# Patient Record
Sex: MALE | Race: WHITE | NOT HISPANIC OR LATINO | Employment: OTHER | URBAN - METROPOLITAN AREA
[De-identification: names, ages, dates, MRNs, and addresses within clinical notes are randomized per-mention and may not be internally consistent; named-entity substitution may affect disease eponyms.]

---

## 2019-01-28 ENCOUNTER — HOSPITAL ENCOUNTER (OUTPATIENT)
Dept: NUTRITION | Facility: HOSPITAL | Age: 64
End: 2019-01-29
Attending: INTERNAL MEDICINE | Admitting: INTERNAL MEDICINE

## 2019-01-28 LAB
ABS NEUT (OLG): 7.92 X10(3)/MCL (ref 2.1–9.2)
ALBUMIN SERPL-MCNC: 3.7 GM/DL (ref 3.4–5)
ALBUMIN/GLOB SERPL: 1 {RATIO}
ALP SERPL-CCNC: 50 UNIT/L (ref 50–136)
ALT SERPL-CCNC: 21 UNIT/L (ref 12–78)
APTT PPP: 29.5 SECOND(S) (ref 24.8–36.9)
AST SERPL-CCNC: 13 UNIT/L (ref 15–37)
BASOPHILS # BLD AUTO: 0 X10(3)/MCL (ref 0–0.2)
BASOPHILS NFR BLD AUTO: 0 %
BILIRUB SERPL-MCNC: 0.4 MG/DL (ref 0.2–1)
BILIRUBIN DIRECT+TOT PNL SERPL-MCNC: 0.1 MG/DL (ref 0–0.2)
BILIRUBIN DIRECT+TOT PNL SERPL-MCNC: 0.3 MG/DL (ref 0–0.8)
BUN SERPL-MCNC: 13 MG/DL (ref 7–18)
CALCIUM SERPL-MCNC: 9.3 MG/DL (ref 8.5–10.1)
CHLORIDE SERPL-SCNC: 101 MMOL/L (ref 98–107)
CO2 SERPL-SCNC: 28 MMOL/L (ref 21–32)
CREAT SERPL-MCNC: 1.1 MG/DL (ref 0.7–1.3)
EOSINOPHIL # BLD AUTO: 0 X10(3)/MCL (ref 0–0.9)
EOSINOPHIL NFR BLD AUTO: 0 %
ERYTHROCYTE [DISTWIDTH] IN BLOOD BY AUTOMATED COUNT: 14.4 % (ref 11.5–17)
GLOBULIN SER-MCNC: 3.6 GM/DL (ref 2.4–3.5)
GLUCOSE SERPL-MCNC: 96 MG/DL (ref 74–106)
HCT VFR BLD AUTO: 50.5 % (ref 42–52)
HGB BLD-MCNC: 17.3 GM/DL (ref 14–18)
INR PPP: 1 (ref 0–1.3)
LYMPHOCYTES # BLD AUTO: 1.3 X10(3)/MCL (ref 0.6–4.6)
LYMPHOCYTES NFR BLD AUTO: 13 %
MCH RBC QN AUTO: 31.7 PG (ref 27–31)
MCHC RBC AUTO-ENTMCNC: 34.3 GM/DL (ref 33–36)
MCV RBC AUTO: 92.7 FL (ref 80–94)
MONOCYTES # BLD AUTO: 0.7 X10(3)/MCL (ref 0.1–1.3)
MONOCYTES NFR BLD AUTO: 7 %
NEUTROPHILS # BLD AUTO: 7.92 X10(3)/MCL (ref 2.1–9.2)
NEUTROPHILS NFR BLD AUTO: 79 %
PLATELET # BLD AUTO: 239 X10(3)/MCL (ref 130–400)
PMV BLD AUTO: 10.5 FL (ref 9.4–12.4)
POTASSIUM SERPL-SCNC: 4.8 MMOL/L (ref 3.5–5.1)
PROT SERPL-MCNC: 7.3 GM/DL (ref 6.4–8.2)
PROTHROMBIN TIME: 13.6 SECOND(S) (ref 12.2–14.7)
RBC # BLD AUTO: 5.45 X10(6)/MCL (ref 4.7–6.1)
SODIUM SERPL-SCNC: 136 MMOL/L (ref 136–145)
TROPONIN I SERPL-MCNC: <0.02 NG/ML (ref 0.02–0.49)
WBC # SPEC AUTO: 10 X10(3)/MCL (ref 4.5–11.5)

## 2019-01-29 LAB
ABS NEUT (OLG): 8.74 X10(3)/MCL (ref 2.1–9.2)
ALBUMIN SERPL-MCNC: 3.2 GM/DL (ref 3.4–5)
ALBUMIN/GLOB SERPL: 1.2 RATIO (ref 1.1–2)
ALP SERPL-CCNC: 50 UNIT/L (ref 50–136)
ALT SERPL-CCNC: 17 UNIT/L (ref 12–78)
APPEARANCE, UA: CLEAR
APTT PPP: 27.6 SECOND(S) (ref 24.8–36.9)
AST SERPL-CCNC: 13 UNIT/L (ref 15–37)
BACTERIA SPEC CULT: NORMAL /HPF
BASOPHILS # BLD AUTO: 0.1 X10(3)/MCL (ref 0–0.2)
BASOPHILS NFR BLD AUTO: 0 %
BILIRUB SERPL-MCNC: 0.9 MG/DL (ref 0.2–1)
BILIRUB UR QL STRIP: NEGATIVE
BILIRUBIN DIRECT+TOT PNL SERPL-MCNC: 0.2 MG/DL (ref 0–0.5)
BILIRUBIN DIRECT+TOT PNL SERPL-MCNC: 0.7 MG/DL (ref 0–0.8)
BUN SERPL-MCNC: 10 MG/DL (ref 7–18)
CALCIUM SERPL-MCNC: 8.7 MG/DL (ref 8.5–10.1)
CHLORIDE SERPL-SCNC: 106 MMOL/L (ref 98–107)
CHOLEST SERPL-MCNC: 178 MG/DL (ref 0–200)
CHOLEST/HDLC SERPL: 3.7 {RATIO} (ref 0–5)
CO2 SERPL-SCNC: 22 MMOL/L (ref 21–32)
COLOR UR: YELLOW
CREAT SERPL-MCNC: 0.8 MG/DL (ref 0.7–1.3)
EOSINOPHIL # BLD AUTO: 0 X10(3)/MCL (ref 0–0.9)
EOSINOPHIL NFR BLD AUTO: 0 %
ERYTHROCYTE [DISTWIDTH] IN BLOOD BY AUTOMATED COUNT: 14.3 % (ref 11.5–17)
EST. AVERAGE GLUCOSE BLD GHB EST-MCNC: 114 MG/DL
GLOBULIN SER-MCNC: 2.6 GM/DL (ref 2.4–3.5)
GLUCOSE (UA): NEGATIVE
GLUCOSE SERPL-MCNC: 90 MG/DL (ref 74–106)
HBA1C MFR BLD: 5.6 % (ref 4.2–6.3)
HCT VFR BLD AUTO: 48 % (ref 42–52)
HDLC SERPL-MCNC: 48 MG/DL (ref 35–60)
HGB BLD-MCNC: 16.4 GM/DL (ref 14–18)
HGB UR QL STRIP: NEGATIVE
INR PPP: 1 (ref 0–1.3)
KETONES UR QL STRIP: NEGATIVE
LDLC SERPL CALC-MCNC: 112 MG/DL (ref 0–129)
LEUKOCYTE ESTERASE UR QL STRIP: NEGATIVE
LYMPHOCYTES # BLD AUTO: 1.5 X10(3)/MCL (ref 0.6–4.6)
LYMPHOCYTES NFR BLD AUTO: 13 %
MCH RBC QN AUTO: 31.7 PG (ref 27–31)
MCHC RBC AUTO-ENTMCNC: 34.2 GM/DL (ref 33–36)
MCV RBC AUTO: 92.7 FL (ref 80–94)
MONOCYTES # BLD AUTO: 0.8 X10(3)/MCL (ref 0.1–1.3)
MONOCYTES NFR BLD AUTO: 7 %
NEUTROPHILS # BLD AUTO: 8.74 X10(3)/MCL (ref 2.1–9.2)
NEUTROPHILS NFR BLD AUTO: 78 %
NITRITE UR QL STRIP: NEGATIVE
PH UR STRIP: 7 [PH] (ref 5–9)
PLATELET # BLD AUTO: 243 X10(3)/MCL (ref 130–400)
PMV BLD AUTO: 10.3 FL (ref 9.4–12.4)
POTASSIUM SERPL-SCNC: 4 MMOL/L (ref 3.5–5.1)
PROT SERPL-MCNC: 5.8 GM/DL (ref 6.4–8.2)
PROT UR QL STRIP: NEGATIVE
PROTHROMBIN TIME: 13.5 SECOND(S) (ref 12.2–14.7)
RBC # BLD AUTO: 5.18 X10(6)/MCL (ref 4.7–6.1)
RBC #/AREA URNS HPF: NORMAL /[HPF]
SODIUM SERPL-SCNC: 137 MMOL/L (ref 136–145)
SP GR UR STRIP: 1.01 (ref 1–1.03)
SQUAMOUS EPITHELIAL, UA: NORMAL
TRIGL SERPL-MCNC: 91 MG/DL (ref 30–150)
TSH SERPL-ACNC: 2.11 MIU/L (ref 0.36–3.74)
UROBILINOGEN UR STRIP-ACNC: 1
VLDLC SERPL CALC-MCNC: 18 MG/DL
WBC # SPEC AUTO: 11.2 X10(3)/MCL (ref 4.5–11.5)
WBC #/AREA URNS HPF: NORMAL /HPF

## 2021-02-18 ENCOUNTER — HISTORICAL (OUTPATIENT)
Dept: RADIOLOGY | Facility: HOSPITAL | Age: 66
End: 2021-02-18

## 2021-03-01 LAB — CRC RECOMMENDATION EXT: NORMAL

## 2021-04-08 LAB — CRC RECOMMENDATION EXT: NORMAL

## 2023-01-03 LAB
CHOLEST SERPL-MSCNC: 170 MG/DL (ref 0–200)
HDLC SERPL-MCNC: 87 MG/DL (ref 35–70)
LDLC SERPL CALC-MCNC: 87 MG/DL (ref 0–160)
TRIGL SERPL-MCNC: 7067 MG/DL (ref 40–160)

## 2023-05-04 DIAGNOSIS — R06.02 SHORTNESS OF BREATH: Primary | ICD-10-CM

## 2023-05-31 ENCOUNTER — PROCEDURE VISIT (OUTPATIENT)
Dept: RESPIRATORY THERAPY | Facility: HOSPITAL | Age: 68
End: 2023-05-31
Attending: INTERNAL MEDICINE
Payer: MEDICARE

## 2023-05-31 VITALS — HEART RATE: 83 BPM | RESPIRATION RATE: 20 BRPM | OXYGEN SATURATION: 98 %

## 2023-05-31 DIAGNOSIS — R06.02 SHORTNESS OF BREATH: ICD-10-CM

## 2023-05-31 PROCEDURE — 94727 GAS DIL/WSHOT DETER LNG VOL: CPT

## 2023-05-31 PROCEDURE — 94729 DIFFUSING CAPACITY: CPT

## 2023-05-31 PROCEDURE — 94760 N-INVAS EAR/PLS OXIMETRY 1: CPT

## 2023-05-31 PROCEDURE — 94060 EVALUATION OF WHEEZING: CPT

## 2023-05-31 RX ORDER — ALBUTEROL SULFATE 0.83 MG/ML
SOLUTION RESPIRATORY (INHALATION)
Status: DISPENSED
Start: 2023-05-31 | End: 2023-06-01

## 2023-05-31 RX ORDER — ALBUTEROL SULFATE 0.83 MG/ML
2.5 SOLUTION RESPIRATORY (INHALATION)
Status: COMPLETED | OUTPATIENT
Start: 2023-05-31 | End: 2023-05-31

## 2023-05-31 RX ADMIN — ALBUTEROL SULFATE 2.5 MG: 0.83 SOLUTION RESPIRATORY (INHALATION) at 10:05

## 2023-07-10 LAB
CHOLEST SERPL-MSCNC: 169 MG/DL (ref 0–200)
HDLC SERPL-MCNC: 65 MG/DL (ref 35–70)
LDLC SERPL CALC-MCNC: 90 MG/DL (ref 0–160)
TRIGL SERPL-MCNC: 62 MG/DL (ref 40–160)

## 2024-01-08 LAB
CHOLEST SERPL-MSCNC: 173 MG/DL (ref 0–200)
HDLC SERPL-MCNC: 69 MG/DL (ref 35–70)
LDLC SERPL CALC-MCNC: 88 MG/DL (ref 0–160)
TRIGL SERPL-MCNC: 72 MG/DL (ref 40–160)

## 2024-02-20 ENCOUNTER — PATIENT OUTREACH (OUTPATIENT)
Dept: ADMINISTRATIVE | Facility: HOSPITAL | Age: 69
End: 2024-02-20
Payer: MEDICARE

## 2024-02-20 NOTE — PROGRESS NOTES
Population Health Outreach.         Laproscopic Colectomy 2021     Sigmoid Colon H&P  Operative note  Pre-Op Note

## 2024-02-28 ENCOUNTER — PATIENT OUTREACH (OUTPATIENT)
Dept: ADMINISTRATIVE | Facility: HOSPITAL | Age: 69
End: 2024-02-28
Payer: MEDICARE

## 2024-02-28 ENCOUNTER — OFFICE VISIT (OUTPATIENT)
Dept: FAMILY MEDICINE | Facility: CLINIC | Age: 69
End: 2024-02-28
Payer: MEDICARE

## 2024-02-28 ENCOUNTER — TELEPHONE (OUTPATIENT)
Dept: FAMILY MEDICINE | Facility: CLINIC | Age: 69
End: 2024-02-28

## 2024-02-28 VITALS
HEIGHT: 71 IN | HEART RATE: 71 BPM | WEIGHT: 192.69 LBS | TEMPERATURE: 98 F | SYSTOLIC BLOOD PRESSURE: 138 MMHG | OXYGEN SATURATION: 96 % | DIASTOLIC BLOOD PRESSURE: 84 MMHG | RESPIRATION RATE: 20 BRPM | BODY MASS INDEX: 26.98 KG/M2

## 2024-02-28 DIAGNOSIS — Z76.89 ESTABLISHING CARE WITH NEW DOCTOR, ENCOUNTER FOR: ICD-10-CM

## 2024-02-28 DIAGNOSIS — Z86.010 ENCOUNTER FOR COLONOSCOPY FOLLOWING COLON POLYP REMOVAL: ICD-10-CM

## 2024-02-28 DIAGNOSIS — Z28.21 PNEUMOCOCCAL VACCINATION DECLINED BY PATIENT: ICD-10-CM

## 2024-02-28 DIAGNOSIS — F10.20 ALCOHOLISM: ICD-10-CM

## 2024-02-28 DIAGNOSIS — Z13.1 SCREENING FOR DIABETES MELLITUS: ICD-10-CM

## 2024-02-28 DIAGNOSIS — E78.2 MIXED HYPERLIPIDEMIA: ICD-10-CM

## 2024-02-28 DIAGNOSIS — Z00.00 MEDICARE ANNUAL WELLNESS VISIT, SUBSEQUENT: ICD-10-CM

## 2024-02-28 DIAGNOSIS — Z09 ENCOUNTER FOR COLONOSCOPY FOLLOWING COLON POLYP REMOVAL: ICD-10-CM

## 2024-02-28 DIAGNOSIS — Z11.59 NEED FOR HEPATITIS C SCREENING TEST: ICD-10-CM

## 2024-02-28 DIAGNOSIS — F17.210 CIGARETTE NICOTINE DEPENDENCE WITHOUT COMPLICATION: ICD-10-CM

## 2024-02-28 DIAGNOSIS — J43.8 OTHER EMPHYSEMA: ICD-10-CM

## 2024-02-28 DIAGNOSIS — Z12.2 ENCOUNTER FOR SCREENING FOR LUNG CANCER: ICD-10-CM

## 2024-02-28 DIAGNOSIS — I10 PRIMARY HYPERTENSION: Primary | Chronic | ICD-10-CM

## 2024-02-28 PROBLEM — E78.5 HYPERLIPIDEMIA: Status: ACTIVE | Noted: 2024-02-28

## 2024-02-28 PROBLEM — K63.5 POLYP OF COLON: Chronic | Status: ACTIVE | Noted: 2024-02-28

## 2024-02-28 PROBLEM — E78.5 HYPERLIPIDEMIA: Chronic | Status: ACTIVE | Noted: 2024-02-28

## 2024-02-28 PROBLEM — Z86.0100 PERSONAL HISTORY OF COLONIC POLYPS: Status: ACTIVE | Noted: 2024-02-28

## 2024-02-28 PROBLEM — K63.5 POLYP OF COLON: Status: ACTIVE | Noted: 2024-02-28

## 2024-02-28 PROCEDURE — 3008F BODY MASS INDEX DOCD: CPT | Mod: CPTII,,, | Performed by: FAMILY MEDICINE

## 2024-02-28 PROCEDURE — 4010F ACE/ARB THERAPY RXD/TAKEN: CPT | Mod: CPTII,,, | Performed by: FAMILY MEDICINE

## 2024-02-28 PROCEDURE — 3075F SYST BP GE 130 - 139MM HG: CPT | Mod: CPTII,,, | Performed by: FAMILY MEDICINE

## 2024-02-28 PROCEDURE — 1101F PT FALLS ASSESS-DOCD LE1/YR: CPT | Mod: CPTII,,, | Performed by: FAMILY MEDICINE

## 2024-02-28 PROCEDURE — 1159F MED LIST DOCD IN RCRD: CPT | Mod: CPTII,,, | Performed by: FAMILY MEDICINE

## 2024-02-28 PROCEDURE — 3079F DIAST BP 80-89 MM HG: CPT | Mod: CPTII,,, | Performed by: FAMILY MEDICINE

## 2024-02-28 PROCEDURE — 3288F FALL RISK ASSESSMENT DOCD: CPT | Mod: CPTII,,, | Performed by: FAMILY MEDICINE

## 2024-02-28 PROCEDURE — 1160F RVW MEDS BY RX/DR IN RCRD: CPT | Mod: CPTII,,, | Performed by: FAMILY MEDICINE

## 2024-02-28 PROCEDURE — 1126F AMNT PAIN NOTED NONE PRSNT: CPT | Mod: CPTII,,, | Performed by: FAMILY MEDICINE

## 2024-02-28 PROCEDURE — 99204 OFFICE O/P NEW MOD 45 MIN: CPT | Mod: ,,, | Performed by: FAMILY MEDICINE

## 2024-02-28 RX ORDER — ROSUVASTATIN CALCIUM 5 MG/1
5 TABLET, COATED ORAL NIGHTLY
COMMUNITY
End: 2024-04-09 | Stop reason: SDUPTHER

## 2024-02-28 RX ORDER — AMLODIPINE BESYLATE 10 MG/1
10 TABLET ORAL DAILY
COMMUNITY
End: 2024-04-09 | Stop reason: SDUPTHER

## 2024-02-28 RX ORDER — LOSARTAN POTASSIUM 100 MG/1
100 TABLET ORAL DAILY
COMMUNITY
Start: 2024-02-19 | End: 2024-04-09 | Stop reason: SDUPTHER

## 2024-02-28 RX ORDER — ASPIRIN 81 MG/1
81 TABLET ORAL DAILY
COMMUNITY
End: 2024-04-09 | Stop reason: SDUPTHER

## 2024-02-28 RX ORDER — HYDROCHLOROTHIAZIDE 25 MG/1
25 TABLET ORAL DAILY
COMMUNITY
Start: 2024-02-08 | End: 2024-04-09 | Stop reason: SDUPTHER

## 2024-02-28 NOTE — TELEPHONE ENCOUNTER
Please request last year of labs from Dr. Fregsoo and also call and find out when hsi last wellness was. Thank you

## 2024-02-28 NOTE — LETTER
"  This communication is flagged as high priority.        AUTHORIZATION FOR RELEASE OF   CONFIDENTIAL INFORMATION    Dear Staff JOSE MANUEL,    We are seeing Lore Rene, date of birth 1955, in the clinic at [facility]. Mariajose Hagen MD is the patient's PCP. Lore Rene has an outstanding lab/procedure at the time we reviewed his chart. In order to help keep his health information updated, he has authorized us to request the following medical record(s):        (  )  MAMMOGRAM                                      ( xx )  COLONOSCOPY/PATH      (  )  PAP SMEAR                                          (  )  OUTSIDE LAB RESULTS     (  )  DEXA SCAN                                          (  )  EYE EXAM            (  )  FOOT EXAM                                          (  )  ENTIRE RECORD     (  )  OUTSIDE IMMUNIZATIONS                 (  )  _______________         Please fax records to [facility], Bienvenu[place]Oubre, Shauna, [place],  [phone]  Attn: Lynn      If you have any questions, please contact Michelle Benites" [facility] Coordinator @ [phone]    Patient Name: Lore Rene  : 1955  Patient Phone #: [phone]     "

## 2024-02-28 NOTE — LETTER
AUTHORIZATION FOR RELEASE OF   CONFIDENTIAL INFORMATION    Dear Dr. Fregoso,    We are seeing Lore Rene, date of birth 1955, in the clinic at [facility]. Mariajose Hagen MD is the patient's PCP. Lore Rene has an outstanding lab/procedure at the time we reviewed his chart. In order to help keep his health information updated, he has authorized us to request the following medical record(s):        (  )  MAMMOGRAM                                      (  )  COLONOSCOPY      (  )  PAP SMEAR                                          (  )  OUTSIDE LAB RESULTS     (  )  DEXA SCAN                                          (  )  EYE EXAM            (  )  FOOT EXAM                                          (  )  ENTIRE RECORD     (  )  OUTSIDE IMMUNIZATIONS                 (  x) Last Wellness Note & Last Labs          Please fax records to [facility], Bienvenu[place]Oubre, Shauna, [place], [phone]          Patient Name: Lore Rene  : 1955  Patient Phone #: [phone]

## 2024-02-28 NOTE — PROGRESS NOTES
Lore Rene  02/28/2024  [id number]    Mariajose Hagen MD  Patient Care Team:  Mariajose Hagen MD as PCP - General (Family Medicine)  Armaan Wood MD as Consulting Physician (Cardiology)  Bill Trinh MD as Consulting Physician (Gastroenterology)  Otis Isaacs MD (Dermatology)  Marques Jimenez MD as Consulting Physician (Urology)      Chief Complaint:  Chief Complaint   Patient presents with    Establish Care     Needs PCP-Pt previously seen by Dr Hills       History of Present Illness:    69 y.o. male who presents today to [facility]. He has htn, hld, copd and is a current smoker. He has no concerns or complaints today. He is followed by cardiology. Denies claudication, edema, chest pain or shortness of breath. Had neg [facility] a few years ago. He also sees Dr. Trinh for scopes. He has failed chantix and patches and is not interested in quitting smoking. He agrees to ct lung cancer screen.     Review of Systems  General: denies f/c, weight loss, night sweats, decreased appetite  Eye: denies blurred vision, changes in vision  Respiratory: denies sob, wheezing, cough  Cardiovascular: denies chest pain, palpitations, edema  Gastrointestinal: denies abdominal pain, n/v, constipation, diarrhea  Integumentary: denies rashes, pruritis    Past Medical History  Past Medical History:   Diagnosis Date    Hyperlipidemia     Hypertension     Mass of colon 05/18/2021    Sigmoid       Medications  Medication List with Changes/Refills   Current Medications    AMLODIPINE (NORVASC) 10 MG TABLET    Take 10 mg by mouth once daily.    ASPIRIN (ECOTRIN) 81 MG EC TABLET    Take 81 mg by mouth once daily.    HYDROCHLOROTHIAZIDE (HYDRODIURIL) 25 MG TABLET    Take 25 mg by mouth once daily.    LOSARTAN (COZAAR) 100 MG TABLET    Take 100 mg by mouth once daily.    ROSUVASTATIN (CRESTOR) 5 MG TABLET    Take 5 mg by mouth every evening.       Past Surgical History:   Procedure Laterality Date    COLECTOMY,  "SIGMOID  05/18/2021    Dr. Otis Banda    COLON SURGERY  2030    Removal os Polyps    SMALL INTESTINE SURGERY  2020    Polyps    SPINE SURGERY      Neck    VASECTOMY  1980       SUBJECTIVE:  Health Maintenance  The patient has no Health Maintenance topics of status Not Due  Health Maintenance Due   Topic Date Due    Hepatitis C Screening  Never done    COVID-19 Vaccine (1) Never done    TETANUS VACCINE  Never done    Colorectal Cancer Screening  Never done    Shingles Vaccine (1 of 2) Never done    RSV Vaccine (Age 60+ and Pregnant patients) (1 - 1-dose 60+ series) Never done    Hemoglobin A1c (Diabetic Prevention Screening)  01/29/2022    Lipid Panel  01/29/2024       Exam:  Vitals:    02/28/24 0823   BP: 138/84   BP Location: Right arm   Patient Position: Sitting   BP Method: Large (Automatic)   Pulse: 71   Resp: 20   Temp: 98 °F (36.7 °C)   TempSrc: Oral   SpO2: 96%   Weight: 87.4 kg (192 lb 11.2 oz)   Height: 5' 11" (1.803 m)     Weight: 87.4 kg (192 lb 11.2 oz)   Body mass index is 26.88 kg/m².      Physical Exam  Constitutional: NAD, alert, pleasant  Respiratory: wheezes and rhonchi diffusely and bilaterally. No accessory muscle use  Eyes: EOMI  Cardiovascular: RRR, No m/r/g. No JVD. No LE edema  Integumentary: warm, dry, intact  Psych: AA&Ox3      ICD-10-CM ICD-9-CM   1. Primary hypertension  I10 401.9   2. Cigarette nicotine dependence without complication  F17.210 305.1   3. Mixed hyperlipidemia  E78.2 272.2   4. Other emphysema  J43.8 492.8   5. Encounter for screening for lung cancer  Z12.2 V76.0   6. Alcoholism  F10.20 303.90   7. Pneumococcal vaccination declined by patient  Z28.21 V64.06   8. Establishing care with new doctor, encounter for  Z76.89 V65.8   9. Medicare annual wellness visit, subsequent  Z00.00 V70.0   10. Screening for diabetes mellitus  Z13.1 V77.1   11. Need for hepatitis C screening test  Z11.59 V73.89   12. Encounter for colonoscopy following colon polyp removal  Z09 V67.09    " Z86.010 V12.72       1. Primary hypertension  Overview:  At goal on current meds. Asymptomatic. Sees cardiology.     Continue current Rx meds      Orders:  -     CBC Auto Differential; Future; Expected date: 02/28/2025  -     Comprehensive Metabolic Panel; Future; Expected date: 02/28/2025    2. Cigarette nicotine dependence without complication  Overview:  He smokes 1.5 ppd x 50 years. Not ready to quit.     Orders:  -     CT Chest Lung Screening Low Dose; Future; Expected date: 02/28/2024    3. Mixed hyperlipidemia  Overview:  On crestor. No recent lipids to review. States he just had labs    Orders:  -     Lipid Panel; Future; Expected date: 02/28/2025    4. Other emphysema  Overview:  Pft 5/2023 moderate to severe obstruction. Still smokes 1.5 ppd. Failed chantix and patches. Denies shortness of breath and declines any inhalers    Smoking cessation encouraged      Orders:  -     TSH; Future; Expected date: 02/28/2025    5. Encounter for screening for lung cancer  -     CT Chest Lung Screening Low Dose; Future; Expected date: 02/28/2024    6. Alcoholism  Overview:  Patient drinks 5 beers per night.     Will check cmp at next visit      7. Pneumococcal vaccination declined by patient    8. Establishing care with new doctor, encounter for    9. Medicare annual wellness visit, subsequent  -     CBC Auto Differential; Future; Expected date: 02/28/2025  -     Comprehensive Metabolic Panel; Future; Expected date: 02/28/2025  -     Lipid Panel; Future; Expected date: 02/28/2025  -     TSH; Future; Expected date: 02/28/2025  -     Urinalysis; Future; Expected date: 02/28/2025  -     Hepatitis C Antibody; Future; Expected date: 02/28/2025    10. Screening for diabetes mellitus    11. Need for hepatitis C screening test  -     Hepatitis C Antibody; Future; Expected date: 02/28/2025    12. Encounter for colonoscopy following colon polyp removal  -     TSH; Future; Expected date: 02/28/2025         Follow up: Follow up for 6  mts htn and one medicare wellness with labs.      Care Plan/Goals: Reviewed   Goals    None

## 2024-02-28 NOTE — LETTER
AUTHORIZATION FOR RELEASE OF   CONFIDENTIAL INFORMATION    Dear Dr. Wood,    We are seeing Lore Rene, date of birth 1955, in the clinic at [facility]. Mariajose Hagen MD is the patient's PCP. Lore Rene has an outstanding lab/procedure at the time we reviewed his chart. In order to help keep his health information updated, he has authorized us to request the following medical record(s):        (  )  MAMMOGRAM                                      (  )  COLONOSCOPY      (  )  PAP SMEAR                                          (  )  OUTSIDE LAB RESULTS     (  )  DEXA SCAN                                          (  )  EYE EXAM            (  )  FOOT EXAM                                          (  )  ENTIRE RECORD     (  )  OUTSIDE IMMUNIZATIONS                 (x  )  Last 2 Office Notes & Labs         Please fax records to [facility], Bienvenu-Oubre, Shauna, [place], [phone].          Patient Name: Lore Rene  : 1955  Patient Phone #: [phone]

## 2024-03-02 ENCOUNTER — PATIENT MESSAGE (OUTPATIENT)
Dept: ADMINISTRATIVE | Facility: HOSPITAL | Age: 69
End: 2024-03-02
Payer: MEDICARE

## 2024-03-03 DIAGNOSIS — Z12.11 SCREENING FOR COLON CANCER: ICD-10-CM

## 2024-03-06 ENCOUNTER — HOSPITAL ENCOUNTER (OUTPATIENT)
Dept: RADIOLOGY | Facility: HOSPITAL | Age: 69
Discharge: HOME | End: 2024-03-06
Attending: FAMILY MEDICINE
Payer: MEDICARE

## 2024-03-06 DIAGNOSIS — Z87.891 PERSONAL HISTORY OF SMOKING: ICD-10-CM

## 2024-03-06 PROBLEM — J43.1 PANLOBULAR EMPHYSEMA: Status: ACTIVE | Noted: 2024-02-28

## 2024-03-06 PROCEDURE — 71271 CT THORAX LUNG CANCER SCR C-: CPT | Mod: TC

## 2024-03-07 NOTE — PROGRESS NOTES
The following record(s)  below were uploaded for Health Maintenance .    COLONOSCOPY     03/01/2021.....04/01/2021

## 2024-03-22 LAB — HEMOCCULT STL QL IA: NEGATIVE

## 2024-04-09 DIAGNOSIS — I10 PRIMARY HYPERTENSION: Primary | ICD-10-CM

## 2024-04-09 DIAGNOSIS — E78.2 MIXED HYPERLIPIDEMIA: ICD-10-CM

## 2024-04-09 RX ORDER — HYDROCHLOROTHIAZIDE 25 MG/1
25 TABLET ORAL DAILY
Qty: 90 TABLET | Refills: 3 | Status: SHIPPED | OUTPATIENT
Start: 2024-04-09

## 2024-04-09 RX ORDER — AMLODIPINE BESYLATE 10 MG/1
10 TABLET ORAL DAILY
Qty: 90 TABLET | Refills: 3 | Status: SHIPPED | OUTPATIENT
Start: 2024-04-09

## 2024-04-09 RX ORDER — ROSUVASTATIN CALCIUM 5 MG/1
5 TABLET, COATED ORAL NIGHTLY
Qty: 90 TABLET | Refills: 3 | Status: SHIPPED | OUTPATIENT
Start: 2024-04-09

## 2024-04-09 RX ORDER — LOSARTAN POTASSIUM 100 MG/1
100 TABLET ORAL DAILY
Qty: 90 TABLET | Refills: 3 | Status: SHIPPED | OUTPATIENT
Start: 2024-04-09

## 2024-04-09 RX ORDER — ASPIRIN 81 MG/1
81 TABLET ORAL DAILY
Qty: 90 TABLET | Refills: 3 | Status: SHIPPED | OUTPATIENT
Start: 2024-04-09

## 2024-04-09 NOTE — TELEPHONE ENCOUNTER
----- Message from Suzi Benoit sent at 4/9/2024  9:51 AM CDT -----  Regarding: refill  .Type:  RX Refill Request    Who Called: Pt's wife, Jena    Refill or New Rx:refill    RX Name and Strength:amLODIPine (NORVASC) 10 MG tablet - -  - --  Sig - Route: Take 10 mg by mouth once daily. - Oral    losartan (COZAAR) 100 MG tablet - - 2/19/2024 - --  Sig - Route: Take 100 mg by mouth once daily. - Oral    hydroCHLOROthiazide (HYDRODIURIL) 25 MG tablet - - 2/8/2024 - --  Sig - Route: Take 25 mg by mouth once daily. - Oral    rosuvastatin (CRESTOR) 5 MG tablet - -  - --  Sig - Route: Take 5 mg by mouth every evening. - Oral    aspirin (ECOTRIN) 81 MG EC tablet - -  - No  Sig - Route: Take 81 mg by mouth once daily. - Oral      How is the patient currently taking it? (ex. 1XDay):    Is this a 30 day or 90 day RX:    Preferred Pharmacy with phone number:[facility] - [place] Serr[place] - [address]   Phone: [phone]  Fax: [phone]        Local or Mail Order:local    Ordering Provider:    Would the patient rather a call back or a response via [facility]?     Best Call Back Number:[phone]    Additional Information: Refill request. Please advise. Thanks.

## 2024-04-22 ENCOUNTER — TELEPHONE (OUTPATIENT)
Dept: FAMILY MEDICINE | Facility: CLINIC | Age: 69
End: 2024-04-22
Payer: MEDICARE

## 2024-04-22 DIAGNOSIS — J43.1 PANLOBULAR EMPHYSEMA: Primary | ICD-10-CM

## 2024-04-22 RX ORDER — FLUTICASONE FUROATE, UMECLIDINIUM BROMIDE AND VILANTEROL TRIFENATATE 200; 62.5; 25 UG/1; UG/1; UG/1
1 POWDER RESPIRATORY (INHALATION) DAILY
Qty: 60 EACH | Refills: 6 | Status: SHIPPED | OUTPATIENT
Start: 2024-04-22 | End: 2025-04-22

## 2024-04-22 RX ORDER — ALBUTEROL SULFATE 90 UG/1
2 AEROSOL, METERED RESPIRATORY (INHALATION) EVERY 6 HOURS PRN
Qty: 18 G | Refills: 6 | Status: SHIPPED | OUTPATIENT
Start: 2024-04-22 | End: 2025-04-22

## 2024-04-22 NOTE — TELEPHONE ENCOUNTER
"Pts wife notified of the recommendations. Verbal understanding given. She will talk to pt about coming for an appt and give us a call back to let us know what he says. I explained the importance of pt coming in to be seen if he is having an exacerbation. She understands but voiced that pt is "hard headed."  "

## 2024-04-22 NOTE — TELEPHONE ENCOUNTER
----- Message from Xin Abbott sent at 4/22/2024 11:27 AM CDT -----  Regarding: med  .Type:  Needs Medical Advice    Who Called: pt wife  Would the patient rather a call back or a response via [facility]? demarcus  Best Call Back Number: [phone]  Additional Information: requesting script for copd

## 2024-04-22 NOTE — TELEPHONE ENCOUNTER
If patient is currently symptomatic, I would recommend he come in for me to listen to him for in case he is having an exacerbation. This would be treated differently. However, I will send in the daily maintenance inhaler for copd in addition to a rescue inhaler called albuterol that he would use if he is acutely short of breath or wheezing.     If he is having an exacerbation, he would benefit from a steroid shot and possible neb treatments

## 2024-04-22 NOTE — TELEPHONE ENCOUNTER
Pt's wife called stating that pt was offered an inhaler at his last appt but he refused. He is now asking if the inhaler can be ordered. She states that pt is having some coughing and SOB. Please advise

## 2024-07-29 DIAGNOSIS — J43.1 PANLOBULAR EMPHYSEMA: ICD-10-CM

## 2024-07-29 RX ORDER — FLUTICASONE FUROATE, UMECLIDINIUM BROMIDE AND VILANTEROL TRIFENATATE 200; 62.5; 25 UG/1; UG/1; UG/1
1 POWDER RESPIRATORY (INHALATION) DAILY
Qty: 60 EACH | Refills: 6 | Status: SHIPPED | OUTPATIENT
Start: 2024-07-29

## 2024-08-01 ENCOUNTER — TELEPHONE (OUTPATIENT)
Dept: FAMILY MEDICINE | Facility: CLINIC | Age: 69
End: 2024-08-01
Payer: MEDICARE

## 2024-08-01 NOTE — TELEPHONE ENCOUNTER
LM on pts wife's VM letting her know that pt has refills on the Crestor as a script was sent to the pharmacy on 04/09/2024 for a 90 day supply with 3 refills

## 2024-09-04 ENCOUNTER — OFFICE VISIT (OUTPATIENT)
Dept: FAMILY MEDICINE | Facility: CLINIC | Age: 69
End: 2024-09-04
Payer: MEDICARE

## 2024-09-04 VITALS
DIASTOLIC BLOOD PRESSURE: 76 MMHG | OXYGEN SATURATION: 97 % | SYSTOLIC BLOOD PRESSURE: 119 MMHG | HEIGHT: 71 IN | WEIGHT: 187.69 LBS | RESPIRATION RATE: 20 BRPM | TEMPERATURE: 98 F | BODY MASS INDEX: 26.27 KG/M2 | HEART RATE: 83 BPM

## 2024-09-04 DIAGNOSIS — Z12.11 COLON CANCER SCREENING: ICD-10-CM

## 2024-09-04 DIAGNOSIS — I10 PRIMARY HYPERTENSION: Primary | Chronic | ICD-10-CM

## 2024-09-04 DIAGNOSIS — Z86.010 PERSONAL HISTORY OF COLONIC POLYPS: ICD-10-CM

## 2024-09-04 DIAGNOSIS — J43.1 PANLOBULAR EMPHYSEMA: ICD-10-CM

## 2024-09-04 PROCEDURE — 3008F BODY MASS INDEX DOCD: CPT | Mod: CPTII,,, | Performed by: FAMILY MEDICINE

## 2024-09-04 PROCEDURE — 1159F MED LIST DOCD IN RCRD: CPT | Mod: CPTII,,, | Performed by: FAMILY MEDICINE

## 2024-09-04 PROCEDURE — 3074F SYST BP LT 130 MM HG: CPT | Mod: CPTII,,, | Performed by: FAMILY MEDICINE

## 2024-09-04 PROCEDURE — 1160F RVW MEDS BY RX/DR IN RCRD: CPT | Mod: CPTII,,, | Performed by: FAMILY MEDICINE

## 2024-09-04 PROCEDURE — 1126F AMNT PAIN NOTED NONE PRSNT: CPT | Mod: CPTII,,, | Performed by: FAMILY MEDICINE

## 2024-09-04 PROCEDURE — 3288F FALL RISK ASSESSMENT DOCD: CPT | Mod: CPTII,,, | Performed by: FAMILY MEDICINE

## 2024-09-04 PROCEDURE — 3078F DIAST BP <80 MM HG: CPT | Mod: CPTII,,, | Performed by: FAMILY MEDICINE

## 2024-09-04 PROCEDURE — 1101F PT FALLS ASSESS-DOCD LE1/YR: CPT | Mod: CPTII,,, | Performed by: FAMILY MEDICINE

## 2024-09-04 PROCEDURE — 4010F ACE/ARB THERAPY RXD/TAKEN: CPT | Mod: CPTII,,, | Performed by: FAMILY MEDICINE

## 2024-09-04 PROCEDURE — 99214 OFFICE O/P EST MOD 30 MIN: CPT | Mod: ,,, | Performed by: FAMILY MEDICINE

## 2024-09-04 PROCEDURE — G2211 COMPLEX E/M VISIT ADD ON: HCPCS | Mod: ,,, | Performed by: FAMILY MEDICINE

## 2024-09-04 NOTE — PROGRESS NOTES
Lore Rene  09/04/2024  [id number]    Mariajose Hagen MD  Patient Care Team:  Mariajose Hagen MD as PCP - General (Family Medicine)  Armaan Wood MD as Consulting Physician (Cardiology)  Bill Trinh MD as Consulting Physician (Gastroenterology)  Otis Isaacs MD (Dermatology)  Marques Jimenez MD as Consulting Physician (Urology)      Chief Complaint:  Chief Complaint   Patient presents with    Follow-up     6 month f/u for HTN       History of Present Illness:    69 y.o. male who presents today for htn f/u. Bp at goal.    I advised patient that he is overdue for a colonoscopy. Had polyps with resection in 2021 with DR. Trinh. Neg FIT test recently.     Visit today included increased complexity associated with the care of the episodic problem htn, emphysema addressed and managing the longitudinal care of the patient due to the serious and/or complex managed problem(s) .      Review of Systems  General: denies f/c, weight loss, night sweats, decreased appetite  Eye: denies blurred vision, changes in vision  Respiratory: denies sob, wheezing, cough  Cardiovascular: denies chest pain, palpitations, edema  Gastrointestinal: denies abdominal pain, n/v, constipation, diarrhea  Integumentary: denies rashes, pruritis    Past Medical History  Past Medical History:   Diagnosis Date    Emphysema of lung     Hyperlipidemia     Hypertension     Mass of colon 05/18/2021    Sigmoid    Personal history of colonic polyps 03/01/2021    Dr. BETTY Trinh       Medications  Medication List with Changes/Refills   Current Medications    ALBUTEROL (VENTOLIN HFA) 90 MCG/ACTUATION INHALER    Inhale 2 puffs into the lungs every 6 (six) hours as needed for Wheezing. Rescue    AMLODIPINE (NORVASC) 10 MG TABLET    Take 1 tablet (10 mg total) by mouth once daily.    ASPIRIN (ECOTRIN) 81 MG EC TABLET    Take 1 tablet (81 mg total) by mouth once daily.    FLUTICASONE-UMECLIDIN-VILANTER (TRELEGY ELLIPTA)  "200-62.5-25 MCG INHALER    Inhale 1 puff into the lungs once daily.    HYDROCHLOROTHIAZIDE (HYDRODIURIL) 25 MG TABLET    Take 1 tablet (25 mg total) by mouth once daily.    LOSARTAN (COZAAR) 100 MG TABLET    Take 1 tablet (100 mg total) by mouth once daily.    ROSUVASTATIN (CRESTOR) 5 MG TABLET    Take 1 tablet (5 mg total) by mouth every evening.   Discontinued Medications    AMOXICILLIN (AMOXIL) 500 MG CAPSULE    Take by mouth.       Past Surgical History:   Procedure Laterality Date    COLECTOMY, SIGMOID  05/18/2021    Dr. Otis Banda    COLON SURGERY  2030    Removal os Polyps    COLONOSCOPY  04/28/2021    JESSICA Trinh MD    SMALL INTESTINE SURGERY  2020    Polyps    SPINE SURGERY      Neck    VASECTOMY  1980       SUBJECTIVE:  Health Maintenance  Health Maintenance Topics with due status: Not Due       Topic Last Completion Date    LDCT Lung Screen 03/06/2024     Health Maintenance Due   Topic Date Due    Hepatitis C Screening  Never done    TETANUS VACCINE  Never done    Shingles Vaccine (1 of 2) Never done    RSV Vaccine (Age 60+ and Pregnant patients) (1 - 1-dose 60+ series) Never done    Abdominal Aortic Aneurysm Screening  Never done    Hemoglobin A1c (Diabetic Prevention Screening)  01/29/2022    Lipid Panel  01/29/2024    Colorectal Cancer Screening  04/08/2024    Influenza Vaccine (1) 09/01/2024    COVID-19 Vaccine (1 - 2023-24 season) Never done       Exam:  Vitals:    09/04/24 1053   BP: 119/76   BP Location: Right arm   Patient Position: Sitting   BP Method: Large (Automatic)   Pulse: 83   Resp: 20   Temp: 97.9 °F (36.6 °C)   TempSrc: Oral   SpO2: 97%   Weight: 85.1 kg (187 lb 11.2 oz)   Height: 5' 11" (1.803 m)     Weight: 85.1 kg (187 lb 11.2 oz)   Body mass index is 26.18 kg/m².      Physical Exam  Constitutional: NAD, alert, pleasant  Respiratory: CTAB, no wheezes, rales or rhonchi. No accessory muscle use  Eyes: EOMI  Cardiovascular: RRR, No m/r/g. No JVD. No LE edema  Integumentary: warm, " dry, intact  Psych: AA&Ox3      ICD-10-CM ICD-9-CM   1. Primary hypertension  I10 401.9   2. Colon cancer screening  Z12.11 V76.51   3. Personal history of colonic polyps  Z86.010 V12.72   4. Panlobular emphysema  J43.1 492.8       1. Primary hypertension  Overview:  At goal on losartan 100 mg, amlodipine 10 mg, hctz 25 mg.  Asymptomatic. Sees cardiology.     Continue current Rx meds        2. Colon cancer screening  -     Ambulatory referral/consult to Gastroenterology; Future; Expected date: 09/04/2024    3. Personal history of colonic polyps  Overview:  Had multiple polyps and several inches of colon removed by dr holley in 2021. He is followed by dr mays for colonoscopies. Denies melena, hematochezia.     Orders:  -     Ambulatory referral/consult to Gastroenterology; Future; Expected date: 09/04/2024    4. Panlobular emphysema  Overview:  Pft 5/2023 moderate to severe obstruction. Still smokes 1.5 ppd. Failed chantix and patches. Denies shortness of breath and declines any inhalers    Smoking cessation encouraged             Follow up: Follow up if symptoms worsen or fail to improve.      Care Plan/Goals: Reviewed   Goals    None                normal/ROM intact/normal gait/strength 5/5 bilateral upper extremities/strength 5/5 bilateral lower extremities

## 2024-09-17 ENCOUNTER — TELEPHONE (OUTPATIENT)
Dept: FAMILY MEDICINE | Facility: CLINIC | Age: 69
End: 2024-09-17
Payer: MEDICARE

## 2024-09-17 NOTE — TELEPHONE ENCOUNTER
This aptient's medicare wellness was scheduled incorrectly. It is not supposed to be until after 2/28/25

## 2024-09-25 ENCOUNTER — TELEPHONE (OUTPATIENT)
Dept: FAMILY MEDICINE | Facility: CLINIC | Age: 69
End: 2024-09-25
Payer: MEDICARE

## 2024-11-12 LAB — CRC RECOMMENDATION EXT: NORMAL

## 2024-11-15 ENCOUNTER — DOCUMENTATION ONLY (OUTPATIENT)
Dept: FAMILY MEDICINE | Facility: CLINIC | Age: 69
End: 2024-11-15
Payer: MEDICARE

## 2025-01-02 ENCOUNTER — TELEPHONE (OUTPATIENT)
Dept: FAMILY MEDICINE | Facility: CLINIC | Age: 70
End: 2025-01-02
Payer: MEDICARE

## 2025-01-02 NOTE — TELEPHONE ENCOUNTER
----- Message from Mirtha sent at 1/2/2025 10:38 AM CST -----  Who Called: Jena Rene (Spouse)    What order is the patient requesting: Labs   When does the expect the orders to be performed? asap        Preferred Method of Contact: Phone Call  Patient's Preferred Phone Number on File: [phone]   Best Call Back Number, if different:  Additional Information: Pt is requesting lab orders be sent to [facility] [address] [place] [address]

## 2025-01-08 DIAGNOSIS — J43.1 PANLOBULAR EMPHYSEMA: ICD-10-CM

## 2025-01-08 RX ORDER — FLUTICASONE FUROATE, UMECLIDINIUM BROMIDE AND VILANTEROL TRIFENATATE 200; 62.5; 25 UG/1; UG/1; UG/1
1 POWDER RESPIRATORY (INHALATION)
Qty: 60 EACH | Refills: 6 | Status: SHIPPED | OUTPATIENT
Start: 2025-01-08

## 2025-01-27 ENCOUNTER — DOCUMENTATION ONLY (OUTPATIENT)
Dept: FAMILY MEDICINE | Facility: CLINIC | Age: 70
End: 2025-01-27
Payer: MEDICARE

## 2025-01-31 ENCOUNTER — TELEPHONE (OUTPATIENT)
Dept: FAMILY MEDICINE | Facility: CLINIC | Age: 70
End: 2025-01-31
Payer: MEDICARE

## 2025-01-31 NOTE — TELEPHONE ENCOUNTER
----- Message from Tressa sent at 1/31/2025 11:15 AM CST -----  Who Called: Lore Rene    Caller is requesting assistance/information from provider's office.    Symptoms (please be specific):    How long has patient had these symptoms:    List of preferred pharmacies on file (remove unneeded): [unfilled]  If different, enter pharmacy into here including location and phone number:         Patient's Preferred Phone Number on File: [phone]   Best Call Back Number, if different:  Additional Information: pt would like lab order printed, for pick  on Monday

## 2025-02-04 LAB
ALBUMIN SERPL-MCNC: 4.3 G/DL (ref 3.9–4.9)
ALP SERPL-CCNC: 65 IU/L (ref 44–121)
ALT SERPL-CCNC: 13 IU/L (ref 0–44)
APPEARANCE UR: CLEAR
AST SERPL-CCNC: 16 IU/L (ref 0–40)
BACTERIA #/AREA URNS HPF: NORMAL /[HPF]
BASOPHILS # BLD AUTO: 0.1 X10E3/UL (ref 0–0.2)
BASOPHILS NFR BLD AUTO: 1 %
BILIRUB SERPL-MCNC: 0.6 MG/DL (ref 0–1.2)
BILIRUB UR QL STRIP: NEGATIVE
BUN SERPL-MCNC: 9 MG/DL (ref 8–27)
BUN/CREAT SERPL: 11 (ref 10–24)
CALCIUM SERPL-MCNC: 9.7 MG/DL (ref 8.6–10.2)
CHLORIDE SERPL-SCNC: 97 MMOL/L (ref 96–106)
CHOLEST SERPL-MCNC: 170 MG/DL (ref 100–199)
CO2 SERPL-SCNC: 23 MMOL/L (ref 20–29)
COLOR UR: YELLOW
CREAT SERPL-MCNC: 0.84 MG/DL (ref 0.76–1.27)
CRYSTALS URNS MICRO: NORMAL
EOSINOPHIL # BLD AUTO: 0.1 X10E3/UL (ref 0–0.4)
EOSINOPHIL NFR BLD AUTO: 1 %
EPI CELLS #/AREA URNS HPF: NORMAL /HPF (ref 0–10)
ERYTHROCYTE [DISTWIDTH] IN BLOOD BY AUTOMATED COUNT: 14.8 % (ref 11.6–15.4)
EST. GFR  (NO RACE VARIABLE): 95 ML/MIN/1.73
GLOBULIN SER CALC-MCNC: 2.6 G/DL (ref 1.5–4.5)
GLUCOSE SERPL-MCNC: 107 MG/DL (ref 70–99)
GLUCOSE UR QL STRIP: NEGATIVE
HCT VFR BLD AUTO: 51 % (ref 37.5–51)
HCV IGG SERPL QL IA: NON REACTIVE
HDLC SERPL-MCNC: 55 MG/DL
HGB BLD-MCNC: 17.4 G/DL (ref 13–17.7)
HGB UR QL STRIP: NEGATIVE
IMM GRANULOCYTES NFR BLD AUTO: 1 %
KETONES UR QL STRIP: NEGATIVE
LDLC SERPL CALC-MCNC: 97 MG/DL (ref 0–99)
LEUKOCYTE ESTERASE UR QL STRIP: ABNORMAL
LYMPHOCYTES # BLD AUTO: 1.5 X10E3/UL (ref 0.7–3.1)
LYMPHOCYTES NFR BLD AUTO: 12 %
MCH RBC QN AUTO: 32.3 PG (ref 26.6–33)
MCHC RBC AUTO-ENTMCNC: 34.1 G/DL (ref 31.5–35.7)
MCV RBC AUTO: 95 FL (ref 79–97)
MICRO URNS: ABNORMAL
MONOCYTES # BLD AUTO: 0.7 X10E3/UL (ref 0.1–0.9)
MONOCYTES NFR BLD AUTO: 6 %
NEUTROPHILS # BLD AUTO: 10 X10E3/UL (ref 1.4–7)
NEUTROPHILS NFR BLD AUTO: 79 %
NITRITE UR QL STRIP: NEGATIVE
PH UR STRIP: 6.5 [PH] (ref 5–7.5)
PLATELET # BLD AUTO: 357 X10E3/UL (ref 150–450)
POTASSIUM SERPL-SCNC: 4 MMOL/L (ref 3.5–5.2)
PROT SERPL-MCNC: 6.9 G/DL (ref 6–8.5)
PROT UR QL STRIP: NEGATIVE
RBC # BLD AUTO: 5.38 X10E6/UL (ref 4.14–5.8)
RBC #/AREA URNS HPF: NORMAL /HPF (ref 0–2)
SODIUM SERPL-SCNC: 137 MMOL/L (ref 134–144)
SP GR UR STRIP: 1.02 (ref 1–1.03)
SPECIMEN STATUS REPORT: NORMAL
TRIGL SERPL-MCNC: 100 MG/DL (ref 0–149)
TSH SERPL DL<=0.005 MIU/L-ACNC: 1.93 UIU/ML (ref 0.45–4.5)
UROBILINOGEN UR STRIP-MCNC: 1 MG/DL (ref 0.2–1)
VLDLC SERPL CALC-MCNC: 18 MG/DL (ref 5–40)
WBC # BLD AUTO: 12.5 X10E3/UL (ref 3.4–10.8)
WBC #/AREA URNS HPF: NORMAL /HPF (ref 0–5)

## 2025-03-07 ENCOUNTER — HOSPITAL ENCOUNTER (OUTPATIENT)
Dept: RADIOLOGY | Facility: HOSPITAL | Age: 70
Discharge: HOME | End: 2025-03-07
Attending: INTERNAL MEDICINE
Payer: MEDICARE

## 2025-03-07 DIAGNOSIS — Z87.891 PERSONAL HISTORY OF SMOKING: ICD-10-CM

## 2025-03-07 PROCEDURE — 71271 CT THORAX LUNG CANCER SCR C-: CPT | Mod: TC

## 2025-03-21 ENCOUNTER — ANESTHESIA EVENT (OUTPATIENT)
Dept: ENDOSCOPY | Facility: HOSPITAL | Age: 70
End: 2025-03-21
Payer: MEDICARE

## 2025-03-24 ENCOUNTER — OFFICE VISIT (OUTPATIENT)
Dept: FAMILY MEDICINE | Facility: CLINIC | Age: 70
End: 2025-03-24
Payer: MEDICARE

## 2025-03-24 VITALS
WEIGHT: 184.31 LBS | BODY MASS INDEX: 25.8 KG/M2 | DIASTOLIC BLOOD PRESSURE: 70 MMHG | OXYGEN SATURATION: 94 % | RESPIRATION RATE: 12 BRPM | HEIGHT: 71 IN | TEMPERATURE: 98 F | HEART RATE: 80 BPM | SYSTOLIC BLOOD PRESSURE: 110 MMHG

## 2025-03-24 DIAGNOSIS — J43.1 PANLOBULAR EMPHYSEMA: ICD-10-CM

## 2025-03-24 DIAGNOSIS — E78.2 MIXED HYPERLIPIDEMIA: Chronic | ICD-10-CM

## 2025-03-24 DIAGNOSIS — Z78.9 ALCOHOL USE: ICD-10-CM

## 2025-03-24 DIAGNOSIS — I10 PRIMARY HYPERTENSION: Chronic | ICD-10-CM

## 2025-03-24 DIAGNOSIS — Z13.6 ENCOUNTER FOR ABDOMINAL AORTIC ANEURYSM (AAA) SCREENING: ICD-10-CM

## 2025-03-24 DIAGNOSIS — F10.20 ALCOHOLISM: ICD-10-CM

## 2025-03-24 DIAGNOSIS — F17.210 CIGARETTE NICOTINE DEPENDENCE WITHOUT COMPLICATION: Chronic | ICD-10-CM

## 2025-03-24 DIAGNOSIS — F17.200 TOBACCO DEPENDENCE: ICD-10-CM

## 2025-03-24 DIAGNOSIS — Z00.00 MEDICARE ANNUAL WELLNESS VISIT, SUBSEQUENT: Primary | ICD-10-CM

## 2025-03-24 DIAGNOSIS — Z72.3 LACK OF PHYSICAL ACTIVITY: ICD-10-CM

## 2025-03-24 DIAGNOSIS — R91.1 LUNG NODULE SEEN ON IMAGING STUDY: ICD-10-CM

## 2025-03-24 DIAGNOSIS — Z00.00 ENCOUNTER FOR PREVENTIVE HEALTH EXAMINATION: ICD-10-CM

## 2025-03-24 DIAGNOSIS — Z86.0100 HISTORY OF COLONIC POLYPS: ICD-10-CM

## 2025-03-24 RX ORDER — TAMSULOSIN HYDROCHLORIDE 0.4 MG/1
0.4 CAPSULE ORAL DAILY
COMMUNITY

## 2025-03-24 NOTE — PATIENT INSTRUCTIONS
Medicare Annual Wellness Visit      Patient Name: Lore Rene  Today's Date: 3/24/2025    Below you will find your 5-10 year Screening Plan Recommendations:  Health Maintenance       Date Due Completion Date    TETANUS VACCINE Never done ---    Pneumococcal Vaccines (Age 50+) (1 of 2 - PCV) Never done ---    Shingles Vaccine (1 of 2) Never done ---    RSV Vaccine (Age 60+ and Pregnant patients) (1 - Risk 60-74 years 1-dose series) Never done ---    Abdominal Aortic Aneurysm Screening Never done ---    Hemoglobin A1c (Diabetic Prevention Screening) 01/29/2022 1/29/2019    COVID-19 Vaccine (1 - 2024-25 season) Never done ---    LDCT Lung Screen 03/07/2026 3/7/2025    Colorectal Cancer Screening 11/12/2029 11/12/2024    Lipid Panel 02/03/2030 2/3/2025          Below is your summarized Personalized Prevention Plan that addresses any concerns we discussed today at your visit. Please see attached detailed information specific to your Health Concerns.  No orders of the defined types were placed in this encounter.        The following information is provided to all patients.  This information is to help you find additional resources for any problems that may be affecting your health: Living healthy guide: www.[facility].[place].gov      Understanding Diabetes: www.diabetes.org      Eating healthy: www.cdc.gov/healthyweight      CDC home safety checklist: www.cdc.gov/steadi/patient.html      Agency on Aging: www.goea.[place].[facility]      Alcoholics anonymous (AA): www.aa.org      Physical Activity: www.chandan.nih.gov/mv0gutj      Tobacco use: www.quitwithusla.org                                 Patient Education       Quitting Smoking for Older Adults   About this topic   Most of us know that smoking can cause problems to our health. Even if you know that it is bad, you continue smoking. It is because when you start to smoke it is hard to give up. You can become addicted to smoking.  Smoking is very harmful to your health. It can  cause many illnesses and can affect how you look. The longer you smoke, the greater the effects on your health. It is never too late to try to quit.  As you stop smoking, it is normal to have signs of withdrawal. These will lessen over time. You may have signs like:  Trouble sleeping  Feeling more hungry  Weight gain  Hard stools  Dizziness  Sore throat or cough  Being irritable  Being anxious or restless  Getting frustrated or angry  Trouble thinking clearly  Low mood  Certain medicines given to you by your regular doctor can help improve these symptoms.  General   Older smokers are at high risk from smoking. The longer you have smoked, the more toxins you have been exposed to. Toxins are the bad chemicals in the tobacco. As someone who has smoked for a long time, you are more likely to have illnesses related to smoking.  It is never too late to quit smoking. It helps your health even at an older age. You will begin to notice changes soon after you stop smoking. Here are some steps you can take to help you quit smoking:  Set a date to quit smoking.  Tell your family and friends about your plan to quit smoking. Let them know how to help with your plan.  Plan ahead about what you will do instead of smoking when you crave a cigarette.  Remove cigarettes from your home, car, and work.  You may want to talk with a counselor to help you learn about:  What triggers you to smoke.  How to resist cravings.  Things to raise your chance of quitting smoking for good.  Counseling can be in person, over the phone, in a group, online, or through text messages.  Talk with your regular doctor about medicines to help you stop smoking.  Exercise regularly. This may help to lower stress. Going for a walk is good exercise.  Keep your mouth busy with sugar-free candy or gum.  Stay away from people and places that make you want to smoke. If others close to you smoke, ask them to quit with you or to not smoke around you.     What will the  results be?   When you start to quit:  Blood flow improves right away.  Your lungs become more active.  Heart rate and blood pressure lower.  You have less chance of having a heart attack.  You will help others be healthy since they are not around secondhand smoke.  After a few days to weeks:  Food tastes and smells better.  Your lungs begin to work better.  Breathing becomes easier.  After a few weeks to months:  You have more energy.  Lungs become clear and work better.  You are less likely to get colds and other lung infections.  Shortness of breath decreases.  Clogging of sinuses decreases.  When you have fully stopped smoking, after a while you will:  Lower the risk of lung cancer  Lower the risk of cancer of the mouth, esophagus, voice box, bladder, pancreas, cervix, and kidney  Lower the risk of heart illnesses like stroke and heart attack  Preserve your eyesight and the look of your teeth and aging skin  Lessen the risk for having type 2 diabetes  Reverse bone loss  Lessen the risk of postoperative problems  Lessen the risk of peptic ulcer and improve healing if ulcer is already there  Help others be healthy since they are not around secondhand smoke  Secondhand smoke:  It is important to know that smoking does not just affect you, it affects everyone around you.  It can cause cancer and heart disease in nonsmokers.  It is more dangerous for babies, children, and pregnant women.  It causes many of the same health problems in others, including your family and friends.  The only way to stop secondhand smoke is to quit smoking. It is also important to avoid places where you and others are around people who smoke.  What lifestyle changes are needed?   Thoroughly wash and remove all ashtrays from your home and office.  Watch your eating habits and avoid gaining weight. Eat healthy foods and snacks to keep a healthy weight  Keep your mouth busy with sugar free candy and gum.  Change your daily routine. Try to  change things like eat breakfast at a different place or drink tea instead of coffee.  Try to relax. Listen to music, meditate, do yoga or breathing exercises, take a relaxing bath or hot shower.  Control your thoughts and emotions. Write or record a journal, create new hobbies, and think positive.  Connect with family and friends. Talk to a friend, get a pet, share your problems with family members, and participate in your community.  What drugs may be needed?   The doctor may order drugs to:  Help with withdrawal signs  Reduce your urges to smoke  Gums, lozenges, and skin patches may be given as a substitute for tobacco.  Will there be any other care needed?   It helps to have other people to support you when you are trying to quit smoking. Talk to your family and friends about how they can best help you. You may also want to think about support groups or counseling.  When do I need to call the doctor?   You have signs of low mood or depression like:  Feeling sad, down, hopeless, or cranky most of the day, almost every day.  Losing or gaining weight without trying to do so.  Sleeping too much or too little.  Feeling tired or like you have no energy.  Acting restless or having trouble staying still.  Helpful tips   Commit yourself to stop smoking.  Focus on your goals and work to achieve them.  Talk with your doctor if you are thinking about switching to an electronic cigarette.  Where can I learn more?   American Cancer Society  https://www.cancer.org/latest-news/jboil-nql-waxz-to-quit-smoking.html   American Lung Association  http://www.lung.org/stop-smoking/h-gblb-mx-quit/   Centers for Disease Control and Prevention  http://www.cdc.gov/tobacco/campaign/tips/   Last Reviewed Date   2021-06-08  Consumer Information Use and Disclaimer   This information is not specific medical advice and does not replace information you receive from your health care provider. This is only a brief summary of general information. It  does NOT include all information about conditions, illnesses, injuries, tests, procedures, treatments, therapies, discharge instructions or life-style choices that may apply to you. You must talk with your health care provider for complete information about your health and treatment options. This information should not be used to decide whether or not to accept your health care providers advice, instructions or recommendations. Only your health care provider has the knowledge and training to provide advice that is right for you.  Copyright   Copyright © 2021 [employer]. and its affiliates and/or licensors. All rights reserved.    Patient Education       Smoking: Not Just Harmful to Your Lungs and Heart   About this topic   Smoking is very common at any age. It is one of the leading causes of poor health and illness. Smoking can lead to death. It has many harmful chemicals that are released by the tobacco you smoke and inhale. Tobacco has many forms. These are:  Cigarettes  Pipes  Cigars  Chewing tobacco  Electronic cigarettes  Loose  Hookah  All kinds of tobaccos contain many toxic substances. These are what make you sick from smoking.  Nicotine - The main thing that makes you addicted to smoking  Carbon monoxide - A poison that gets into your blood when smoking  Tar - Has chemicals that are cancerous  Lead and many others  These factors affect how much damage smoking will have on you:  How much you smoke  What you smoke  How what you smoke has been prepared  The content of what you smoke  Smoking hurts every part of the body. The lungs and the heart are most often affected by tobacco use.  General   Smoking is very harmful to your body. It can cause many illnesses and can affect how you look.  Smoking and Cancer   Smoking is the most common cause of lung cancer. Smoking may also increase the risk of:  Mouth cancer. This can also be caused by chewing tobacco.  Bladder cancer  Cancer of the tube from the mouth to  stomach. This is also called the esophagus.  Cancer of the throat. This can also be caused by chewing tobacco.  Kidney cancer  Liver cancer  Stomach, colon, and rectal cancer  Cancer of the pancreas  Cancer of the cervix in women  Cancer of the blood or leukemia  Skin cancer  Smoking and Your Lungs   If you smoke you are more likely to have:  Breathing problems  Lung infections like pneumonia or bronchitis  Lung disease such as COPD or emphysema  Asthma  Smoking and the Heart and Circulation   Causes heart disease and stroke  Smokers are at a higher risk for high blood pressure  You are more likely to get blood clots  Smoking can lower blood flow to the legs and skin  Smoking and Diabetes   If you smoke, you:  Have a higher risk of developing diabetes  May have higher blood sugar levels  May have more problems with your diabetes  Smoking and Digestion   Smokers make more stomach acid. This can cause sores or ulcers in your belly or bowel.  Your stomach acids may flow back to your esophagus. This is also called heartburn.  You have more chance of bowel irritation and swelling  Smoking and Your Bones   If you smoke:  Your bone-forming cells don't grow as fast  Your bones may become weaker (osteoporosis)  You may have more low back pain and arthritis  You may have more overuse injuries  You may have a greater risk of breaking bones  Your broken bones may take longer to heal  Smoking and Pregnancy   Makes your baby more prone to problems  You have a higher chance of having a premature baby or baby born early  Your healthy baby may die without reason. This is called sudden infant death syndrome.  Your baby may be born dead (stillbirth)  Your baby may have a low birth weight  You have a higher risk of an ectopic pregnancy or a pregnancy outside of the uterus  You have a higher chance of having a baby with mouth or facial defects  Smoking and Your Eyes, Hair, Hands, and Mouth   If you smoke, you may notice your:  Eyes  become cloudy and form cataracts  Hair may get thin and turn gray sooner than it should  Fingernails turn yellow  Teeth become yellowish brown  Gums have more problems  Teeth have problems or even become loose  Sense of taste and smell start to go away  Breath smells bad  Smoking and Skin   Smoking causes:  Less blood flow to the skin  Wrinkles start early around your eyes and mouth  Dry skin  Your skin to lose elasticity and strength  Skin may get splotchy or pale  Your face to look thin and old. This is called smoker's face.  Greater risk of getting a skin problem called psoriasis  Slower healing of cuts or bruises  Smoking and Sex Life   If you smoke you are more likely to have problems like:  Impotence  Decreased blood flow to the penis. This is also called erectile dysfunction.  Trouble getting pregnant  Early change of life or menopause for women  A higher risk of heart attack or stroke for women who smoke and use birth control pills  Damaged sperm that may lead to problems getting a woman pregnant, birth defects, or miscarriage  Smoking and Your Brain and Behavior   Smoking can:  Affect your mood  Lead to addiction  Cause long-term confusion or damage to your brain cells  Cause low mood  Smoking and Children   If you smoke, your children:  Will be more likely to smoke.  Can be sick from being around your smoke. This is called secondhand smoke.  Need to learn about the bad effects of smoking. Most smokers start before the age of 18. Encourage your children never to smoke.  Smoking and Other Effects   Smoking can cause:  Wounds to take longer to heal  You to eat poorly  Weight loss  Swelling in the body and affect the body's immune or defense system  Rheumatoid arthritis  Greater chance of injury  You to get warts easier (human papillomavirus)  A bad odor in hair and on clothes  You to have poor sports performance  You to spend a lot of money. Smoking is an expensive habit. A smoker who smokes a pack per day in  the [place] will spend over $2000 per year on cigarettes.  Health care to cost more  You to miss work more often  Hearing loss  Even if you have smoked for many years, it is not too late to quit. Your body starts to heal as soon as you stop smoking. It is better to quit when you are young, but you can still get healthier if you quit at any age.       Helpful tips   Some helpful steps you can take to help you quit smoking:  Set a date to quit smoking.  Know the reasons that make you smoke more.  Know why you want to quit smoking.  Write down each time you smoke. Include the time and what you are doing. Plan ahead about what you will do instead of smoking when that time or event reappears.  Tell your family and friends about your plan to quit smoking. Let them know how to help you.  Slowly reduce your smoking.  Remove smoking and tobacco products from your home and other places.  Avoid places and situations where you will more likely smoke. If people close to you smoke, ask them to quit with you. If they do not quit, ask them to not smoke around you.  Reward or treat yourself every time you do not smoke. Do not use food as a reward.  Ask a doctor for help.  Talk with your doctor before you try an electronic cigarette.  Where can I learn more?   Centers for Disease Control and Prevention  http://www.cdc.gov/tobacco/data_statistics/fact_sheets/health_effects/effects_cig_smoking/#overview   Centers for Disease Control and Prevention  https://www.cdc.gov/tobacco/campaign/tips/quit-smoking/guide/quit-plan.html?s_cid=OSH_tips_D9400   [facility]  http://kidshealth.org/teen/drug_alcohol/tobacco/smoking.html#   US Food and Drug Administration  https://www.fda.gov/TobaccoProducts/Labeling/ProductsIngredientsComponents/default.htm   Last Reviewed Date   2021-03-31  Consumer Information Use and Disclaimer   This information is not specific medical advice and does not replace information you receive from your health care provider. This  is only a brief summary of general information. It does NOT include all information about conditions, illnesses, injuries, tests, procedures, treatments, therapies, discharge instructions or life-style choices that may apply to you. You must talk with your health care provider for complete information about your health and treatment options. This information should not be used to decide whether or not to accept your health care providers advice, instructions or recommendations. Only your health care provider has the knowledge and training to provide advice that is right for you.  Copyright   Copyright © 2021 UpToDate, Inc. and its affiliates and/or licensors. All rights reserved.    Patient Education       Exercise and Aging   General   Exercise can help older adults prevent falls and stay independent longer. Exercise may also help:  You have stronger muscles and bones and better balance  You lose weight and have more energy  Make your heart and lungs stronger  Lower your chance of health problems like heart disease, high blood sugar, or breast or colon cancer  Control conditions like high blood pressure and diabetes  You manage stress and get better sleep  Your mood, self-esteem, and self-image  How you think, plan, and pay attention  There are four types of exercise: endurance, strength, balance, and flexibility.  Endurance exercises get your heart rate up and keep it up for a while. Most people should get at least 30 minutes of exercise that gets your heart rate up on 5 or more days each week. Walking, swimming, biking, or going up and down stairs are kinds of exercises to get your heart rate up. You do not have to do all 30 minutes at one time. Try doing 10 minutes 3 times a day.  Strength exercises build muscle. Lifting weights or doing knee bends are kinds of strength exercises.  Balance exercises help to prevent falls. Raise up on your toes or stand on one leg as a kind of balance exercise.  Flexibility  exercises move your joints through a full range of motion and stretch your muscles. Bend forward in a chair to stretch your back, do stretches, or bend and extend your arms or legs as a kind of flexibility exercise. Try doing 10 minutes twice a week.  Plan to include all these exercise types in your exercise program. Always check with your doctor before you start a new exercise program.  Some people do not like formal exercise classes, but there are many ways to work your muscles each day. Here are some things you can do.  Use steps instead of elevators.  Park far away in parking lots to walk farther.  Use the time while you wait. Do knee bends as you hold onto the kitchen counter while you wait for your coffee to brew.  When you unload groceries, lift the bags or a container of milk a few times to exercise your arms and legs.  Walk the long way when you go somewhere.  After you walk to the bathroom, walk a few laps around the house before sitting down.  Try doing different standing exercises after you wash your hands each time in the bathroom.  Do balance exercises while you brush your teeth.  Do an exercise or get up and walk during TV commercials.  Don't forget to exercise small muscle groups like your hands, fingers, ankles, toes, and neck. Wiggle, bend, flex, and rotate these joints from left to right and back to front to help to keep these joints flexible.  When do I need to call the doctor?   Stop exercising and talk to your doctor if you have any of these problems:  Dizziness  Shortness of breath  Pain or pressure in the chest, arms, throat, jaw, or back  Nausea or vomiting  Blood clots  Infection  Joint swelling  Open wounds  Recent hip, back, or eye surgery  New problems that start during exercise  Helpful tips   If you have a health problem like heart disease or diabetes, talk with your doctor about the best exercises for you.  Always warm up before you stretch. Heated muscles stretch much easier than  cool muscles. Try to walk or bike at an easy pace for a few minutes to warm up your muscles.  Slow your pace again after you exercise to cool down and bring your heart rate down slowly.  Be sure you do not hold your breath when you exercise because it can raise your blood pressure. If you tend to hold your breath, try to count out loud when you exercise.  Never bounce when doing stretches. Use slow and steady motions and hold your stretch for 20 to 30 seconds.  Have a routine. Doing exercises before a meal may be a good way to get into a routine.  Set small goals for yourself when you start to exercise. Use a chart to see how much you are doing. Ask someone to exercise with you.  Exercise may be slightly uncomfortable, but you should not have sharp pains. If you do get sharp pains, stop what you are doing. If the sharp pains continue, call your doctor.  Drink plenty of fluids without caffeine when you exercise and afterwards. Avoid outdoor exercise if it is too cold or too hot.  Wear the right clothes and shoes. Try layers of clothes, so that you can take them off if you get too hot. Shoes should fit well and support your feet.  Where can I learn more?   [employer] [place] on Aging  https://www.chandan.nih.gov/health/publication/exercise-physical-activity/introduction   Last Reviewed Date   2021-03-18  Consumer Information Use and Disclaimer   This information is not specific medical advice and does not replace information you receive from your health care provider. This is only a brief summary of general information. It does NOT include all information about conditions, illnesses, injuries, tests, procedures, treatments, therapies, discharge instructions or life-style choices that may apply to you. You must talk with your health care provider for complete information about your health and treatment options. This information should not be used to decide whether or not to accept your health care providers advice,  instructions or recommendations. Only your health care provider has the knowledge and training to provide advice that is right for you.  Copyright   Copyright © 2021 UpToDate, Inc. and its affiliates and/or licensors. All rights reserved.    Patient Education     Alcohol use -- when is drinking a problem?   The Basics   Written by the doctors and editors at [facility]   How do I know if I am drinking too much? -- If alcohol is having a negative effect on your life, you are probably drinking too much. Answer these questions:   Have you lost control of your drinking? For example, do you sometimes find that you drink more than you meant to?   Do you need to drink larger and larger amounts to get the effect you want? Or do you get sick or feel physically uncomfortable if you cut down on your drinking?   Have you lost your job, gotten in trouble with the law, or had problems with your friends or family because of alcohol?  If you said yes to any of these questions, or if you just think that you have a problem, tell your doctor or nurse. They can help you find out if you do have a drinking problem. Do not be embarrassed to talk to them. Alcohol problems are common. But there are treatments that can help.  What happens if I keep drinking too much? -- People who drink too much can get serious liver and heart disease. They can get different types of cancer. And they can damage their brain. Plus, people who drink too much are more likely than people who do not to:   Have car accidents   Kill themselves   Drown   Get seriously hurt  What is alcohol use disorder? -- Alcohol use disorder is basically the medical term for alcoholism or alcohol addiction. People who have alcohol addiction have 2 or more of the following problems. The more of these they have, the more severe their disorder.   They drink more alcohol than they planned to or for a longer time than they planned to.   They wish they could cut down on alcohol, but they  "can't.   They spend a lot of time trying to get alcohol, getting drunk, or recovering from being drunk.   They crave or have a strong desire or urge to drink alcohol.   Because of their alcohol use, they often don't do things that are expected of them, such as go to work or school, remember family events, and clean their home.   They keep drinking even if it causes or worsens problems in their relationships or interactions with other people.   They stop or cut back on important social, work, or fun activities that they used to do.   They keep drinking alcohol even in situations where it is dangerous to do so (such as while driving).   They keep drinking alcohol even when they know that they have a physical or mental problem that was probably caused or made worse by their drinking.   They need to drink more and more to get the same effects that they used to get with less. Or they get less effect from using the amount that used to get them drunk. This is called "tolerance."   They have "withdrawal symptoms" if they stop drinking alcohol after drinking for a long time. Withdrawal symptoms can include:   Sweating or a racing heart   Hand trembling   Insomnia (not being able to sleep)   Nausea or vomiting   Seeing, feeling, or hearing things that aren't really there (these are called "hallucinations")   Being restless   Anxiety   Seizures (these can be serious, even life-threatening)  What treatments can help? -- People who have problems with alcohol can:   See a counselor (such as a psychologist, , or psychiatrist)   Take medicines   Take part in a support group such as Alcoholics Anonymous (sometimes called "AA")  All of these treatments can help, and they can be combined.  There are a few different medicines that doctors and nurses can use to treat alcohol problems. These medicines work in different ways. They can:   Change the way your brain responds to alcohol so that it is less fun   Reduce your " craving for alcohol   Make you feel sick if you drink   Help you feel less sick when you stop drinking  Can I stop drinking on my own? -- Many people get over their drinking problem on their own. But people who have been drinking several days a week for weeks in a row should not try to cut down without the help of a doctor or nurse. People who drink that much can die if they stop or cut down on drinking too quickly.  All topics are updated as new evidence becomes available and our peer review process is complete.  This topic retrieved from [employer] on: May 30, 2024.  Topic 56535 Version 18.0  Release: 32.5.3 - C32.150  © 2024 UpToDate, Inc. and/or its affiliates. All rights reserved.  figure 1: What's a standard drink?     * It can be difficult to estimate the number of standard drinks in a single mixed drink made with hard liquor. Depending on factors such as the type of spirits and the recipe, a mixed drink can contain from one to three or more standard drinks.  Reproduced with content from: National Institutes on Alcohol Abuse and Alcoholism. Rethinking Drinking: Alcohol and your health. Available at: http://rethinkingdrinking.niaaa.nih.gov.  Graphic 56353 Version 1828.0  Consumer Information Use and Disclaimer   Disclaimer: This generalized information is a limited summary of diagnosis, treatment, and/or medication information. It is not meant to be comprehensive and should be used as a tool to help the user understand and/or assess potential diagnostic and treatment options. It does NOT include all information about conditions, treatments, medications, side effects, or risks that may apply to a specific patient. It is not intended to be medical advice or a substitute for the medical advice, diagnosis, or treatment of a health care provider based on the health care provider's examination and assessment of a patient's specific and unique circumstances. Patients must speak with a health care provider for complete  information about their health, medical questions, and treatment options, including any risks or benefits regarding use of medications. This information does not endorse any treatments or medications as safe, effective, or approved for treating a specific patient. UpToDate, Inc. and its affiliates disclaim any warranty or liability relating to this information or the use thereof.The use of this information is governed by the Terms of Use, available at https://www.[employer].com/en/know/clinical-effectiveness-terms. 2024© UpToDate, Inc. and its affiliates and/or licensors. All rights reserved.  Copyright   © 2024 UpToDate, Inc. and/or its affiliates. All rights reserved.

## 2025-03-24 NOTE — H&P (VIEW-ONLY)
Family Medicine      Patient ID: [id number]     Chief Complaint: Medicare Annual Wellness     HPI:     Lore Rene is a 70 y.o. male here today for a Medicare Annual Wellness visit and comprehensive Health Risk Assessment. Labs reviewed by me and were discussed with patient. All questions regarding lab results were answered.     He recently had an abnormal lung cancer screen and will be having a bronchoscopy with Dr. Martinez this week. He continues to smoke.      Health Maintenance         Date Due Completion Date    TETANUS VACCINE Never done ---    RSV Vaccine (Age 60+ and Pregnant patients) (1 - Risk 60-74 years 1-dose series) Never done ---    Abdominal Aortic Aneurysm Screening Never done ---    Hemoglobin A1c (Diabetic Prevention Screening) 01/29/2022 1/29/2019    COVID-19 Vaccine (1 - 2024-25 season) Never done ---    Shingles Vaccine (1 of 2) 03/24/2026 (Originally 1/2/2007) ---    Pneumococcal Vaccines (Age 50+) (1 of 2 - PCV) 03/24/2026 (Originally 1/2/1976) ---    LDCT Lung Screen 03/07/2026 3/7/2025    Colorectal Cancer Screening 11/12/2029 11/12/2024    Lipid Panel 02/03/2030 2/3/2025             Past Medical History:   Diagnosis Date    Chronic cough     Emphysema of lung     Enlarged prostate     Hyperlipidemia     Hypertension     Lymphadenopathy     Mass of colon 05/18/2021    Sigmoid    Personal history of colonic polyps 03/01/2021    Dr. BETTY Trinh        Past Surgical History:   Procedure Laterality Date    COLECTOMY, SIGMOID  05/18/2021    Dr. Otis Banda    COLON SURGERY  2030    Removal os Polyps    COLONOSCOPY  04/28/2021    JESSICA Trinh MD    SMALL INTESTINE SURGERY  2020    Polyps    SPINE SURGERY      Neck    VASECTOMY  1980        Social History     Socioeconomic History    Marital status:    Tobacco Use    Smoking status: Every Day     Current packs/day: 2.00     Average packs/day: 2.0 packs/day for 55.2 years (110.4 ttl pk-yrs)     Types: Cigarettes     Start  date: 1970    Smokeless tobacco: Never   Substance and Sexual Activity    Alcohol use: Yes     Alcohol/week: 10.0 standard drinks of alcohol     Types: 10 Cans of beer per week    Drug use: Never    Sexual activity: Yes     Partners: Female     Social Drivers of Health     Financial Resource Strain: Medium Risk (9/4/2024)    Overall Financial Resource Strain (CARDIA)     Difficulty of Paying Living Expenses: Somewhat hard   Food Insecurity: No Food Insecurity (11/20/2024)    [facility]    Hunger Vital Sign     Worried About Running Out of Food in the Last Year: Never true     Ran Out of Food in the Last Year: Never true   Recent Concern: Food Insecurity - Food Insecurity Present (9/4/2024)    Hunger Vital Sign     Worried About Running Out of Food in the Last Year: Sometimes true     Ran Out of Food in the Last Year: Sometimes true   Transportation Needs: No Transportation Needs (11/20/2024)    [facility]    PRAPARE - Transportation     Lack of Transportation (Medical): No     Lack of Transportation (Non-Medical): No   Physical Activity: Inactive (9/4/2024)    Exercise Vital Sign     Days of Exercise per Week: 0 days     Minutes of Exercise per Session: 0 min   Stress: No Stress Concern Present (2/21/2024)    [place] [employer] [place] [employer] - Occupational Stress Questionnaire     Feeling of Stress : Not at all   Housing Stability: Unknown (11/20/2024)    Received from [facility]    Housing Stability Vital Sign     Unable to Pay for Housing in the Last Year: No     Homeless in the Last Year: No        Family History   Problem Relation Name Age of Onset    Heart disease Mother      Atrial fibrillation Mother      Heart attack Father          Current Outpatient  "Medications   Medication Instructions    albuterol (VENTOLIN HFA) 90 mcg/actuation inhaler 2 puffs, Inhalation, Every 6 hours PRN, Rescue    amLODIPine (NORVASC) 10 mg, Oral, Daily    aspirin (ECOTRIN) 81 mg, Oral, Daily    hydroCHLOROthiazide (HYDRODIURIL) 25 mg, Oral, Daily    losartan (COZAAR) 100 mg, Oral, Daily    rosuvastatin (CRESTOR) 5 mg, Oral, Nightly    tamsulosin (FLOMAX) 0.4 mg, Daily    TRELEGY ELLIPTA 200-62.5-25 mcg inhaler 1 puff, Inhalation       Review of patient's allergies indicates:   Allergen Reactions    Olmesartan Other (See Comments)          There is no immunization history on file for this patient.     Patient Care Team:  Mariajose Hagen MD as PCP - General (Family Medicine)  Israel, Armaan JOES MD as Consulting Physician (Cardiology)  Bill Trinh MD as Consulting Physician (Gastroenterology)  Otis Isaacs MD (Dermatology)  Marques Jimenez MD as Consulting Physician (Urology)    Subjective:     Review of Systems    12 point review of systems conducted, negative except as stated in the history of present illness. See HPI for details.    Objective:     Visit Vitals  /70 (BP Location: Right arm, Patient Position: Sitting)   Pulse 80   Temp 97.6 °F (36.4 °C) (Oral)   Resp 12   Ht 5' 11" (1.803 m)   Wt 83.6 kg (184 lb 4.8 oz)   SpO2 (!) 94%   BMI 25.70 kg/m²       Physical Exam    Gen: alert, pleasant  Eyes: EOMI, no scleral injection  ENT: TM+ light reflexes bilaterally, no cerumen impaction or canal edema/erythema/discharge. No pharyngeal erythema, tonsillary hypertrophy  Oropharynx: no tonsillary hypertrophy, dental caries or disease. No lesions of palate, tongue.  CV: RRR no m/r/g, no LE edema. No JVD  Resp: CTAB, no accessory muscle use  GI: BS+, nontender, nondistended  Lymph: no cervical, postauricular, submandibular nodes  Psych: AA&Ox3    Assessment:       ICD-10-CM ICD-9-CM   1. Medicare annual wellness visit, subsequent  Z00.00 V70.0   2. Alcoholism  F10.20 " 303.90   3. Panlobular emphysema  J43.1 492.8   4. Lung nodule seen on imaging study  R91.1 793.11   5. Primary hypertension  I10 401.9   6. Mixed hyperlipidemia  E78.2 272.2   7. Cigarette nicotine dependence without complication  F17.210 305.1   8. History of colonic polyps  Z86.0100 V12.72   9. Encounter for preventive health examination  Z00.00 V70.0   10. Tobacco dependence  F17.200 305.1   11. Lack of physical activity  Z72.3 V69.0   12. Alcohol use  Z78.9 V49.89   13. Encounter for abdominal aortic aneurysm (AAA) screening  Z13.6 V81.2        Plan:     1. Medicare annual wellness visit, subsequent    2. Alcoholism  Overview:  Patient drinks 5 beers per night.     Will check cmp at next visit      3. Panlobular emphysema  Overview:  Pft 5/2023 moderate to severe obstruction. Still smokes 1.5 ppd. Failed chantix and patches. + shortness of breath. On prn trelegy.     Smoking cessation encouraged    Continue current Rx meds        4. Lung nodule seen on imaging study  Overview:  New spiculated nodule that is suspicious. Seeing Dr. Martinez and will be having a bronchoscopy this week      5. Primary hypertension  Overview:  At goal on losartan 100 mg, amlodipine 10 mg, hctz 25 mg.  Asymptomatic. Sees cardiology.     Continue current Rx meds        6. Mixed hyperlipidemia  Overview:  On crestor 5 mg. Ldl at goal    Continue current Rx meds        7. Cigarette nicotine dependence without complication  Overview:  He smokes 1.5 ppd x 50 years. Not ready to quit.     Smoking cessation encouraged      8. History of colonic polyps  Overview:  Had multiple polyps and several inches of colon removed by dr holley in 2021. He is followed by dr mays for colonoscopies. Denies melena, hematochezia.       9. Encounter for preventive health examination    10. Tobacco dependence    11. Lack of physical activity    12. Alcohol use    13. Encounter for abdominal aortic aneurysm (AAA) screening  -      Retroperitoneal Limited;  Future; Expected date: 03/24/2025         A comprehensive HEALTH RISK ASSESSMENT was completed today. Results are summarized below:    There are NO EMOTIONAL/SOCIAL CONCERNS identified on today's screening for Social Isolation, Depression and Anxiety.  There are NO COGNITIVE FUNCTION CONCERNS identified on today's screening.  The following FUNCTIONAL AND/OR SAFETY CONCERNS were identified on today's screening for Physical Symptoms, Nutritional, Home Safety/Living Situation, Fall Risk, Activities of Daily Living, Independent Activities of Daily Living, Physical Activity,Timed Up and Go test and Whisper test::  *Patient reports NO ROUTINE EXERCISE. (On average, how many days per week do you engage in moderate to strenuous exercise (like a brisk walk)?: (!) 0)  *Patient reports PHYSICAL ACTIVITY LIMITED BY PAIN/FATIGUE. (In the past four weeks have you your activities been limited by pain, tiredness or fatigue?: (!) Yes)   *Patient reports INCONSISTENT SEAT BELT USE. (Do you always fasten your seat belt when you are in a car?: (!) No)  *Patient's WHISPER TEST was ABNORMAL. (Was the patient's Whisper test normal in both ears?: (!) No)  The patient reports NO OPIOID PRESCRIPTIONS. This was confirmed through medication reconciliation and the [facility] website.    The patient is A CURRENT TOBACCO USER.  Tobacco Use: High Risk (3/24/2025)    Patient History     Smoking Tobacco Use: Every Day     Smokeless Tobacco Use: Never     Passive Exposure: Not on file     The patient reports NO SIGNIFICANT ALCOHOL USE.     All Questions regarding food, transportation or housing were not answered today.    The patient was asked and declined the use of a free .    Advance Care Planning   Today we discussed advance care planning. He is interested in learning more about how to make Advance Directives. Information was provided and I offered to discuss more at his discretion.         Provided patient with a 5-10 year written screening  schedule and personal prevention plan. Recommendations were developed using the USPSTF age appropriate recommendations. Education, counseling, and referrals were provided as needed. After Visit Summary printed and given to patient, which includes a list of additional screenings\tests needed.    No follow-ups on file. In addition to their scheduled follow up, the patient has also been instructed to follow up on as needed basis.     Future Appointments   Date Time Provider Department [place]   3/27/2025  8:00 AM [facility] [id number] [facility]   8/11/2025  9:00 AM Reyes Martinez MD [facility] Gilma HERMOSILLO MD       Advance Care Planning   Today we discussed advance care planning. He is interested in learning more about how to make Advance Directives. Information was provided and I offered to discuss more at his discretion.

## 2025-03-24 NOTE — PROGRESS NOTES
Family Medicine      Patient ID: [id number]     Chief Complaint: Medicare Annual Wellness     HPI:     Lore Rene is a 70 y.o. male here today for a Medicare Annual Wellness visit and comprehensive Health Risk Assessment. Labs reviewed by me and were discussed with patient. All questions regarding lab results were answered.     He recently had an abnormal lung cancer screen and will be having a bronchoscopy with Dr. Martinez this week. He continues to smoke.      Health Maintenance         Date Due Completion Date    TETANUS VACCINE Never done ---    RSV Vaccine (Age 60+ and Pregnant patients) (1 - Risk 60-74 years 1-dose series) Never done ---    Abdominal Aortic Aneurysm Screening Never done ---    Hemoglobin A1c (Diabetic Prevention Screening) 01/29/2022 1/29/2019    COVID-19 Vaccine (1 - 2024-25 season) Never done ---    Shingles Vaccine (1 of 2) 03/24/2026 (Originally 1/2/2007) ---    Pneumococcal Vaccines (Age 50+) (1 of 2 - PCV) 03/24/2026 (Originally 1/2/1976) ---    LDCT Lung Screen 03/07/2026 3/7/2025    Colorectal Cancer Screening 11/12/2029 11/12/2024    Lipid Panel 02/03/2030 2/3/2025             Past Medical History:   Diagnosis Date    Chronic cough     Emphysema of lung     Enlarged prostate     Hyperlipidemia     Hypertension     Lymphadenopathy     Mass of colon 05/18/2021    Sigmoid    Personal history of colonic polyps 03/01/2021    Dr. BETTY Trinh        Past Surgical History:   Procedure Laterality Date    COLECTOMY, SIGMOID  05/18/2021    Dr. Otis Banda    COLON SURGERY  2030    Removal os Polyps    COLONOSCOPY  04/28/2021    JESSICA Trinh MD    SMALL INTESTINE SURGERY  2020    Polyps    SPINE SURGERY      Neck    VASECTOMY  1980        Social History     Socioeconomic History    Marital status:    Tobacco Use    Smoking status: Every Day     Current packs/day: 2.00     Average packs/day: 2.0 packs/day for 55.2 years (110.4 ttl pk-yrs)     Types: Cigarettes     Start  date: 1970    Smokeless tobacco: Never   Substance and Sexual Activity    Alcohol use: Yes     Alcohol/week: 10.0 standard drinks of alcohol     Types: 10 Cans of beer per week    Drug use: Never    Sexual activity: Yes     Partners: Female     Social Drivers of Health     Financial Resource Strain: Medium Risk (9/4/2024)    Overall Financial Resource Strain (CARDIA)     Difficulty of Paying Living Expenses: Somewhat hard   Food Insecurity: No Food Insecurity (11/20/2024)    [facility]    Hunger Vital Sign     Worried About Running Out of Food in the Last Year: Never true     Ran Out of Food in the Last Year: Never true   Recent Concern: Food Insecurity - Food Insecurity Present (9/4/2024)    Hunger Vital Sign     Worried About Running Out of Food in the Last Year: Sometimes true     Ran Out of Food in the Last Year: Sometimes true   Transportation Needs: No Transportation Needs (11/20/2024)    Received from [facility]    PRAPARE - Transportation     Lack of Transportation (Medical): No     Lack of Transportation (Non-Medical): No   Physical Activity: Inactive (9/4/2024)    Exercise Vital Sign     Days of Exercise per Week: 0 days     Minutes of Exercise per Session: 0 min   Stress: No Stress Concern Present (2/21/2024)    [facility] [employer] - Occupational Stress Questionnaire     Feeling of Stress : Not at all   Housing Stability: Unknown (11/20/2024)    Received from [facility]    Housing Stability Vital Sign     Unable to Pay for Housing in the Last Year: No     Homeless in the Last Year: No        Family History   Problem Relation Name Age of Onset    Heart disease Mother      Atrial fibrillation Mother      Heart attack Father          Current Outpatient  "Medications   Medication Instructions    albuterol (VENTOLIN HFA) 90 mcg/actuation inhaler 2 puffs, Inhalation, Every 6 hours PRN, Rescue    amLODIPine (NORVASC) 10 mg, Oral, Daily    aspirin (ECOTRIN) 81 mg, Oral, Daily    hydroCHLOROthiazide (HYDRODIURIL) 25 mg, Oral, Daily    losartan (COZAAR) 100 mg, Oral, Daily    rosuvastatin (CRESTOR) 5 mg, Oral, Nightly    tamsulosin (FLOMAX) 0.4 mg, Daily    TRELEGY ELLIPTA 200-62.5-25 mcg inhaler 1 puff, Inhalation       Review of patient's allergies indicates:   Allergen Reactions    Olmesartan Other (See Comments)          There is no immunization history on file for this patient.     Patient Care Team:  Mariajose Hagen MD as PCP - General (Family Medicine)  Israel, Armaan JOSE MD as Consulting Physician (Cardiology)  Bill Trinh MD as Consulting Physician (Gastroenterology)  Otis Isaacs MD (Dermatology)  Marques Jimenez MD as Consulting Physician (Urology)    Subjective:     Review of Systems    12 point review of systems conducted, negative except as stated in the history of present illness. See HPI for details.    Objective:     Visit Vitals  /70 (BP Location: Right arm, Patient Position: Sitting)   Pulse 80   Temp 97.6 °F (36.4 °C) (Oral)   Resp 12   Ht 5' 11" (1.803 m)   Wt 83.6 kg (184 lb 4.8 oz)   SpO2 (!) 94%   BMI 25.70 kg/m²       Physical Exam    Gen: alert, pleasant  Eyes: EOMI, no scleral injection  ENT: TM+ light reflexes bilaterally, no cerumen impaction or canal edema/erythema/discharge. No pharyngeal erythema, tonsillary hypertrophy  Oropharynx: no tonsillary hypertrophy, dental caries or disease. No lesions of palate, tongue.  CV: RRR no m/r/g, no LE edema. No JVD  Resp: CTAB, no accessory muscle use  GI: BS+, nontender, nondistended  Lymph: no cervical, postauricular, submandibular nodes  Psych: AA&Ox3    Assessment:       ICD-10-CM ICD-9-CM   1. Medicare annual wellness visit, subsequent  Z00.00 V70.0   2. Alcoholism  F10.20 " 303.90   3. Panlobular emphysema  J43.1 492.8   4. Lung nodule seen on imaging study  R91.1 793.11   5. Primary hypertension  I10 401.9   6. Mixed hyperlipidemia  E78.2 272.2   7. Cigarette nicotine dependence without complication  F17.210 305.1   8. History of colonic polyps  Z86.0100 V12.72   9. Encounter for preventive health examination  Z00.00 V70.0   10. Tobacco dependence  F17.200 305.1   11. Lack of physical activity  Z72.3 V69.0   12. Alcohol use  Z78.9 V49.89   13. Encounter for abdominal aortic aneurysm (AAA) screening  Z13.6 V81.2        Plan:     1. Medicare annual wellness visit, subsequent    2. Alcoholism  Overview:  Patient drinks 5 beers per night.     Will check cmp at next visit      3. Panlobular emphysema  Overview:  Pft 5/2023 moderate to severe obstruction. Still smokes 1.5 ppd. Failed chantix and patches. + shortness of breath. On prn trelegy.     Smoking cessation encouraged    Continue current Rx meds        4. Lung nodule seen on imaging study  Overview:  New spiculated nodule that is suspicious. Seeing Dr. Martinez and will be having a bronchoscopy this week      5. Primary hypertension  Overview:  At goal on losartan 100 mg, amlodipine 10 mg, hctz 25 mg.  Asymptomatic. Sees cardiology.     Continue current Rx meds        6. Mixed hyperlipidemia  Overview:  On crestor 5 mg. Ldl at goal    Continue current Rx meds        7. Cigarette nicotine dependence without complication  Overview:  He smokes 1.5 ppd x 50 years. Not ready to quit.     Smoking cessation encouraged      8. History of colonic polyps  Overview:  Had multiple polyps and several inches of colon removed by dr holley in 2021. He is followed by dr mays for colonoscopies. Denies melena, hematochezia.       9. Encounter for preventive health examination    10. Tobacco dependence    11. Lack of physical activity    12. Alcohol use    13. Encounter for abdominal aortic aneurysm (AAA) screening  -      Retroperitoneal Limited;  Future; Expected date: 03/24/2025         A comprehensive HEALTH RISK ASSESSMENT was completed today. Results are summarized below:    There are NO EMOTIONAL/SOCIAL CONCERNS identified on today's screening for Social Isolation, Depression and Anxiety.  There are NO COGNITIVE FUNCTION CONCERNS identified on today's screening.  The following FUNCTIONAL AND/OR SAFETY CONCERNS were identified on today's screening for Physical Symptoms, Nutritional, Home Safety/Living Situation, Fall Risk, Activities of Daily Living, Independent Activities of Daily Living, Physical Activity,Timed Up and Go test and Whisper test::  *Patient reports NO ROUTINE EXERCISE. (On average, how many days per week do you engage in moderate to strenuous exercise (like a brisk walk)?: (!) 0)  *Patient reports PHYSICAL ACTIVITY LIMITED BY PAIN/FATIGUE. (In the past four weeks have you your activities been limited by pain, tiredness or fatigue?: (!) Yes)   *Patient reports INCONSISTENT SEAT BELT USE. (Do you always fasten your seat belt when you are in a car?: (!) No)  *Patient's WHISPER TEST was ABNORMAL. (Was the patient's Whisper test normal in both ears?: (!) No)  The patient reports NO OPIOID PRESCRIPTIONS. This was confirmed through medication reconciliation and the [facility] website.    The patient is A CURRENT TOBACCO USER.  Tobacco Use: High Risk (3/24/2025)    Patient History     Smoking Tobacco Use: Every Day     Smokeless Tobacco Use: Never     Passive Exposure: Not on file     The patient reports NO SIGNIFICANT ALCOHOL USE.     All Questions regarding food, transportation or housing were not answered today.    The patient was asked and declined the use of a free .    Advance Care Planning   Today we discussed advance care planning. He is interested in learning more about how to make Advance Directives. Information was provided and I offered to discuss more at his discretion.         Provided patient with a 5-10 year written screening  schedule and personal prevention plan. Recommendations were developed using the USPSTF age appropriate recommendations. Education, counseling, and referrals were provided as needed. After Visit Summary printed and given to patient, which includes a list of additional screenings\tests needed.    No follow-ups on file. In addition to their scheduled follow up, the patient has also been instructed to follow up on as needed basis.     Future Appointments   Date Time Provider Department [place]   3/27/2025  8:00 AM [facility] [address] [place]   8/11/2025  9:00 AM Reyes Martinez MD [facility] Gilma HERMOSILLO MD       Advance Care Planning   Today we discussed advance care planning. He is interested in learning more about how to make Advance Directives. Information was provided and I offered to discuss more at his discretion.

## 2025-03-26 NOTE — CLINICAL REVIEW
no thinners. no recent preop testing. Awaiting cardiology notes/testing. ccv// Cardiology documents reviewed. federica acosta

## 2025-03-27 ENCOUNTER — ANESTHESIA (OUTPATIENT)
Dept: ENDOSCOPY | Facility: HOSPITAL | Age: 70
End: 2025-03-27
Payer: MEDICARE

## 2025-03-27 ENCOUNTER — HOSPITAL ENCOUNTER (OUTPATIENT)
Facility: HOSPITAL | Age: 70
Discharge: HOME | End: 2025-03-27
Attending: INTERNAL MEDICINE | Admitting: INTERNAL MEDICINE
Payer: MEDICARE

## 2025-03-27 ENCOUNTER — HOSPITAL ENCOUNTER (OUTPATIENT)
Dept: RADIOLOGY | Facility: HOSPITAL | Age: 70
Discharge: HOME | End: 2025-03-27
Attending: INTERNAL MEDICINE
Payer: MEDICARE

## 2025-03-27 VITALS
DIASTOLIC BLOOD PRESSURE: 79 MMHG | BODY MASS INDEX: 25.03 KG/M2 | TEMPERATURE: 97 F | OXYGEN SATURATION: 94 % | HEIGHT: 71 IN | WEIGHT: 178.81 LBS | SYSTOLIC BLOOD PRESSURE: 119 MMHG | RESPIRATION RATE: 18 BRPM | HEART RATE: 64 BPM

## 2025-03-27 DIAGNOSIS — R59.1 LYMPHADENOPATHY: ICD-10-CM

## 2025-03-27 PROCEDURE — 87206 SMEAR FLUORESCENT/ACID STAI: CPT | Performed by: INTERNAL MEDICINE

## 2025-03-27 PROCEDURE — 63600175 PHARM REV CODE 636 W HCPCS

## 2025-03-27 PROCEDURE — 31627 NAVIGATIONAL BRONCHOSCOPY: CPT | Performed by: INTERNAL MEDICINE

## 2025-03-27 PROCEDURE — 88177 CYTP FNA EVAL EA ADDL: CPT

## 2025-03-27 PROCEDURE — 76000 FLUOROSCOPY <1 HR PHYS/QHP: CPT | Mod: TC

## 2025-03-27 PROCEDURE — 88173 CYTOPATH EVAL FNA REPORT: CPT

## 2025-03-27 PROCEDURE — 99900035 HC TECH TIME PER 15 MIN (STAT)

## 2025-03-27 PROCEDURE — 25000003 PHARM REV CODE 250

## 2025-03-27 PROCEDURE — 88305 TISSUE EXAM BY PATHOLOGIST: CPT | Performed by: INTERNAL MEDICINE

## 2025-03-27 PROCEDURE — 87116 MYCOBACTERIA CULTURE: CPT | Performed by: INTERNAL MEDICINE

## 2025-03-27 PROCEDURE — 87102 FUNGUS ISOLATION CULTURE: CPT | Performed by: INTERNAL MEDICINE

## 2025-03-27 PROCEDURE — 87070 CULTURE OTHR SPECIMN AEROBIC: CPT | Performed by: INTERNAL MEDICINE

## 2025-03-27 PROCEDURE — 31624 DX BRONCHOSCOPE/LAVAGE: CPT | Performed by: INTERNAL MEDICINE

## 2025-03-27 PROCEDURE — 99900025 HC BRONCHOSCOPY-ASST (STAT)

## 2025-03-27 PROCEDURE — 27201423 OPTIME MED/SURG SUP & DEVICES STERILE SUPPLY: Performed by: INTERNAL MEDICINE

## 2025-03-27 PROCEDURE — 31628 BRONCHOSCOPY/LUNG BX EACH: CPT | Performed by: INTERNAL MEDICINE

## 2025-03-27 PROCEDURE — 31629 BRONCHOSCOPY/NEEDLE BX EACH: CPT | Performed by: INTERNAL MEDICINE

## 2025-03-27 PROCEDURE — 37000009 HC ANESTHESIA EA ADD 15 MINS: Performed by: INTERNAL MEDICINE

## 2025-03-27 PROCEDURE — 37000008 HC ANESTHESIA 1ST 15 MINUTES: Performed by: INTERNAL MEDICINE

## 2025-03-27 PROCEDURE — 88172 CYTP DX EVAL FNA 1ST EA SITE: CPT

## 2025-03-27 PROCEDURE — 99900031 HC PATIENT EDUCATION (STAT)

## 2025-03-27 PROCEDURE — 88112 CYTOPATH CELL ENHANCE TECH: CPT

## 2025-03-27 PROCEDURE — 99900026 HC AIRWAY MAINTENANCE (STAT)

## 2025-03-27 RX ORDER — ONDANSETRON HYDROCHLORIDE 2 MG/ML
INJECTION, SOLUTION INTRAMUSCULAR; INTRAVENOUS
Status: DISCONTINUED | OUTPATIENT
Start: 2025-03-27 | End: 2025-03-27

## 2025-03-27 RX ORDER — DEXAMETHASONE SODIUM PHOSPHATE 4 MG/ML
INJECTION, SOLUTION INTRA-ARTICULAR; INTRALESIONAL; INTRAMUSCULAR; INTRAVENOUS; SOFT TISSUE
Status: DISCONTINUED | OUTPATIENT
Start: 2025-03-27 | End: 2025-03-27

## 2025-03-27 RX ORDER — PHENYLEPHRINE HCL IN 0.9% NACL 1 MG/10 ML
SYRINGE (ML) INTRAVENOUS
Status: DISCONTINUED | OUTPATIENT
Start: 2025-03-27 | End: 2025-03-27

## 2025-03-27 RX ORDER — ONDANSETRON HYDROCHLORIDE 2 MG/ML
4 INJECTION, SOLUTION INTRAVENOUS ONCE AS NEEDED
Status: DISCONTINUED | OUTPATIENT
Start: 2025-03-27 | End: 2025-03-27 | Stop reason: HOSPADM

## 2025-03-27 RX ORDER — SODIUM CHLORIDE, SODIUM GLUCONATE, SODIUM ACETATE, POTASSIUM CHLORIDE AND MAGNESIUM CHLORIDE 30; 37; 368; 526; 502 MG/100ML; MG/100ML; MG/100ML; MG/100ML; MG/100ML
INJECTION, SOLUTION INTRAVENOUS CONTINUOUS
Status: DISCONTINUED | OUTPATIENT
Start: 2025-03-27 | End: 2025-03-27 | Stop reason: HOSPADM

## 2025-03-27 RX ORDER — GLUCAGON 1 MG
1 KIT INJECTION
Status: DISCONTINUED | OUTPATIENT
Start: 2025-03-27 | End: 2025-03-27 | Stop reason: HOSPADM

## 2025-03-27 RX ORDER — LIDOCAINE HYDROCHLORIDE 10 MG/ML
1 INJECTION, SOLUTION EPIDURAL; INFILTRATION; INTRACAUDAL; PERINEURAL ONCE
Status: DISCONTINUED | OUTPATIENT
Start: 2025-03-27 | End: 2025-03-27 | Stop reason: HOSPADM

## 2025-03-27 RX ORDER — FENTANYL CITRATE 50 UG/ML
INJECTION, SOLUTION INTRAMUSCULAR; INTRAVENOUS
Status: DISCONTINUED | OUTPATIENT
Start: 2025-03-27 | End: 2025-03-27

## 2025-03-27 RX ORDER — PROPOFOL 10 MG/ML
VIAL (ML) INTRAVENOUS CONTINUOUS PRN
Status: DISCONTINUED | OUTPATIENT
Start: 2025-03-27 | End: 2025-03-27

## 2025-03-27 RX ORDER — DEXMEDETOMIDINE HYDROCHLORIDE 100 UG/ML
INJECTION, SOLUTION INTRAVENOUS
Status: DISCONTINUED | OUTPATIENT
Start: 2025-03-27 | End: 2025-03-27 | Stop reason: HOSPADM

## 2025-03-27 RX ORDER — ROCURONIUM BROMIDE 10 MG/ML
INJECTION, SOLUTION INTRAVENOUS
Status: DISCONTINUED | OUTPATIENT
Start: 2025-03-27 | End: 2025-03-27

## 2025-03-27 RX ORDER — LIDOCAINE HYDROCHLORIDE 20 MG/ML
INJECTION, SOLUTION EPIDURAL; INFILTRATION; INTRACAUDAL; PERINEURAL
Status: DISCONTINUED | OUTPATIENT
Start: 2025-03-27 | End: 2025-03-27

## 2025-03-27 RX ORDER — MIDAZOLAM HYDROCHLORIDE 1 MG/ML
INJECTION INTRAMUSCULAR; INTRAVENOUS
Status: DISCONTINUED | OUTPATIENT
Start: 2025-03-27 | End: 2025-03-27

## 2025-03-27 RX ORDER — PROPOFOL 10 MG/ML
VIAL (ML) INTRAVENOUS
Status: DISCONTINUED | OUTPATIENT
Start: 2025-03-27 | End: 2025-03-27

## 2025-03-27 RX ADMIN — FENTANYL CITRATE 100 MCG: 50 INJECTION, SOLUTION INTRAMUSCULAR; INTRAVENOUS at 08:03

## 2025-03-27 RX ADMIN — ROCURONIUM BROMIDE 100 MG: 10 SOLUTION INTRAVENOUS at 08:03

## 2025-03-27 RX ADMIN — MIDAZOLAM HYDROCHLORIDE 2 MG: 1 INJECTION, SOLUTION INTRAMUSCULAR; INTRAVENOUS at 08:03

## 2025-03-27 RX ADMIN — PROPOFOL 150 MCG/KG/MIN: 10 INJECTION, EMULSION INTRAVENOUS at 08:03

## 2025-03-27 RX ADMIN — ONDANSETRON 4 MG: 2 INJECTION INTRAMUSCULAR; INTRAVENOUS at 09:03

## 2025-03-27 RX ADMIN — PROPOFOL 160 MG: 10 INJECTION, EMULSION INTRAVENOUS at 08:03

## 2025-03-27 RX ADMIN — SUGAMMADEX 200 MG: 100 INJECTION, SOLUTION INTRAVENOUS at 09:03

## 2025-03-27 RX ADMIN — LIDOCAINE HYDROCHLORIDE 80 MG: 20 INJECTION, SOLUTION INTRAVENOUS at 08:03

## 2025-03-27 RX ADMIN — DEXMEDETOMIDINE 10 MCG: 200 INJECTION, SOLUTION INTRAVENOUS at 09:03

## 2025-03-27 RX ADMIN — DEXAMETHASONE SODIUM PHOSPHATE 8 MG: 4 INJECTION, SOLUTION INTRA-ARTICULAR; INTRALESIONAL; INTRAMUSCULAR; INTRAVENOUS; SOFT TISSUE at 08:03

## 2025-03-27 RX ADMIN — Medication 100 MCG: at 08:03

## 2025-03-27 RX ADMIN — SODIUM CHLORIDE, SODIUM GLUCONATE, SODIUM ACETATE, POTASSIUM CHLORIDE AND MAGNESIUM CHLORIDE: 526; 502; 368; 37; 30 INJECTION, SOLUTION INTRAVENOUS at 07:03

## 2025-03-27 NOTE — ANESTHESIA PROCEDURE NOTES
Intubation    Date/Time: 3/27/2025 8:17 AM    Performed by: Geno Anderson CRNA  Authorized by: Fredy Jimenez MD    Intubation:     Induction:  Intravenous    Intubated:  Postinduction    Mask Ventilation:  Easy mask    Attempts:  1    Attempted By:  CRNA    Method of Intubation:  Video laryngoscopy    Blade:  Montiel 4    Laryngeal View Grade: Grade I - full view of cords      Difficult Airway Encountered?: No      Complications:  None    Airway Device:  Oral endotracheal tube    Airway Device Size:  9.0    Style/Cuff Inflation:  Cuffed (inflated to minimal occlusive pressure)    Inflation Amount (mL):  6    Tube secured:  23    Secured at:  The lips    Placement Verified By:  Capnometry    Complicating Factors:  None    Findings Post-Intubation:  BS equal bilateral and atraumatic/condition of teeth unchanged

## 2025-03-27 NOTE — PROCEDURES
[facility]  Bronchoscopy Procedure Note    SUMMARY     Date of Procedure: 3/27/2025    Procedure: Bronchoscopy, Diagnostic  Ion navigational bronchoscopy, needle biopsy of right upper lobe lesion, transbronchial biopsies of right upper lobe lesion, BAL in right upper lobe, fluoroscopic guidance    Performed by: Reyes Martinez MD    Pre-Procedure Diagnosis:  Lung nodule    Post-Procedure Diagnosis:  Same    Anesthesia: General Anesthesia        Description of the Findings of the Procedure:     Patient was consented for the procedure with all risk and benefit of the procedure explained in detail.  Patient was given the opportunity to ask questions and all concerns were answered.  The bronchocope was inserted into the main airway via the LMA. An anatomical survey was done of the main airways and the subsegmental bronchi.  There was prominent mucus pitting throughout the airways.  A small amount of secretions were cleared and the bronchoscope was withdrawn.  The procedure was then initiated using Ion robotic bronchoscopy.  The case had been pre planned with the navigational software and the optical probe and catheter were synchronized to the pre prepared plan.  I then was able to navigate out to the lesion.  There appeared to be fluoroscopic concordance.  Several passes in different orientations do the lesion were made with needles and transbronchial biopsies were performed at 2 different positions.  Rapid path was sent several times.  There was mentioned of histiocytes inflammatory cells on 1 pass suggesting that the lesion may have been intercepted.  A BAL was performed in our final position and then the catheter was removed and the robot was undocked.  Patient tolerated the procedure well and will be recovered in PACU before returning to outpatient surgery      Complications: None; patient tolerated the procedure well.    Estimated Blood Loss (EBL): Minimal           Specimens:  Needle biopsies,  transbronchial biopsies, and BAL from right upper lobe lesion       Condition: Stable        Disposition: PACU - hemodynamically stable.  He will we will be recovered in PACU and then an outpatient surgery before returning home today.  Chest x-ray will be performed in PACU.  When cleared by protocol he will be discharged home with instructions to not operate a motor vehicle or heavy machinery today.  By that time he can resume diet and all medications.  By tomorrow he will have no restrictions.  He will be called with the results by myself and appropriate follow-up will be established    Reyes Martinez MD  [facility]

## 2025-03-27 NOTE — DISCHARGE INSTRUCTIONS
-Your physician will be in contact with you regarding your results and if any follow up instructions are needed.    - NO driving and NO alcohol consumption for 24 hours and while taking narcotic pain medication.    - Mild cough and/or sore throat is expected. Red streaks may be present in mucus. Hard candies, peppermints, throat lozenges, gargling with warm water and salt may help    - Although no incisions were made monitor for signs of infection such as fever or chills.    - Report to your nearest ER and/or call your doctor if you experience any SUDDEN/SEVERE chest/abdominal pain, weakness, trouble breathing, uncontrolled pain, coughing up more than 2oz of blood.

## 2025-03-27 NOTE — ANESTHESIA POSTPROCEDURE EVALUATION
Anesthesia Post Evaluation    Patient: Lore Rene    Procedure(s) Performed: Procedure(s) (LRB):  BRONCHOSCOPY, NAVIGATIONAL (N/A)  BRONCHOSCOPY, WITH TRANSBRONCHIAL BIOPSY (Right)  ENDOBRONCHIAL ULTRASOUND (EBUS) (Right)  LAVAGE, BRONCHOALVEOLAR (Right)    Final Anesthesia Type: general      Patient location during evaluation: floor  Patient participation: Yes- Able to Participate  Level of consciousness: awake and alert  Post-procedure vital signs: reviewed and stable  Pain management: adequate  Airway patency: patent    PONV status at discharge: No PONV  Anesthetic complications: no      Cardiovascular status: blood pressure returned to baseline  Respiratory status: spontaneous ventilation and room air  Hydration status: euvolemic  Follow-up not needed.              Vitals Value Taken Time   /79 03/27/25 11:30   Temp 36.2 °C (97.2 °F) 03/27/25 10:00   Pulse 64 03/27/25 11:41   Resp 17 03/27/25 10:25   SpO2 94 % 03/27/25 11:41   Vitals shown include unfiled device data.      Event Time   Out of Recovery 10:25:00         Pain/Sylvie Score: Sylvie Score: 10 (3/27/2025 12:00 PM)  Modified Sylvie Score: 20 (3/27/2025 12:00 PM)

## 2025-03-27 NOTE — ANESTHESIA PREPROCEDURE EVALUATION
03/26/2025  Lore Rene is a 70 y.o., male, who presents for the following:    Procedure: BRONCHOSCOPY, NAVIGATIONAL (Chest)   Anesthesia type: General   Diagnosis: Lymphadenopathy [R59.1]   Pre-op diagnosis: Lymphadenopathy [R59.1]   Location: [facility] / [facility] BRONCHOSCOPY [facility]   Surgeons: Reyes Martinez MD     Past Medical History:   Diagnosis Date    Chronic cough     Emphysema of lung     Enlarged prostate     Hyperlipidemia     Hypertension     Lymphadenopathy     Mass of colon 05/18/2021    Sigmoid    Personal history of colonic polyps 03/01/2021    Dr. BETTY Trinh         LAB :  reviewed / acceptable    Pre-op Assessment    I have reviewed the Patient Summary Reports.     I have reviewed the Nursing Notes. I have reviewed the NPO Status.   I have reviewed the Medications.     Review of Systems  Anesthesia Hx:  No problems with previous Anesthesia             Denies Family Hx of Anesthesia complications.    Denies Personal Hx of Anesthesia complications.                    Social:  Smoker, Alcohol Use       Cardiovascular:     Hypertension           hyperlipidemia                               Pulmonary:   COPD      Emphysema               Psych:     Alcoholism               Physical Exam  General: Alert and Oriented    Airway:  Mallampati: II   Mouth Opening: Normal  TM Distance: Normal  Tongue: Normal  Neck ROM: Normal ROM    Dental:  Edentulous    Chest/Lungs:  Normal Respiratory Rate    Heart:  Rate: Normal  Rhythm: Regular Rhythm        Anesthesia Plan  Type of Anesthesia, risks & benefits discussed:    Anesthesia Type: Gen ETT  Intra-op Monitoring Plan: Standard ASA Monitors  Post Op Pain Control Plan: IV/PO Opioids PRN  Induction:  IV  Airway Plan: Direct  Informed Consent: Informed consent signed with the Patient and all parties understand the risks and agree with anesthesia  plan.  All questions answered. Patient consented to blood products? No  ASA Score: 3  Day of Surgery Review of History & Physical: H&P Update referred to the surgeon/provider.  Anesthesia Plan Notes: Premedication: Nebulizer given by Respiratory Therapist Preprocedure  Technique: General IV with propfol/remifentanyl infusion with rocuronium for muscle relaxation- no inhalational agent to be used)  Airway: OETT -Goal of 9.0 if possible but 8.5 can be used may have more resistance in ventilation - goal for cuff position just below cords and will confirm with bronchoscopic visualization  Goal is to start with PEEP 10-12 cm H20 (may need to increase in obesity and lower lobe patients)  Tidal Volumes 10-12 cc/kg IBW - goal is to avoid atelectasis which may cause divergence from plan on CT  Special Techniques: Perform 4 recruitment maneuvers after intubation. Maintain higher PEEP throughout, rocuronium for muscle relaxation, timed breath-hold at end of inspiration, breath hold for 10 seconds before Xray sweep and when requested              Ready For Surgery From Anesthesia Perspective.     .

## 2025-03-27 NOTE — TRANSFER OF CARE
"Anesthesia Transfer of Care Note    Patient: Lore Rene    Procedure(s) Performed: Procedure(s) (LRB):  BRONCHOSCOPY, NAVIGATIONAL (N/A)  BRONCHOSCOPY, WITH TRANSBRONCHIAL BIOPSY (Right)  ENDOBRONCHIAL ULTRASOUND (EBUS) (Right)    Patient location: PACU    Anesthesia Type: general    Transport from OR: Transported from OR on room air with adequate spontaneous ventilation    Post pain: adequate analgesia    Post assessment: no apparent anesthetic complications    Post vital signs: stable    Level of consciousness: awake, alert and oriented    Nausea/Vomiting: no nausea/vomiting    Complications: none    Transfer of care protocol was followed      Last vitals: Visit Vitals  /81   Pulse 62   Temp 36.2 °C (97.2 °F) (Temporal)   Resp 16   Ht 5' 11" (1.803 m)   Wt 81.1 kg (178 lb 12.7 oz)   SpO2 95%   BMI 24.94 kg/m²     "

## 2025-03-28 LAB — M AVIUM PARATB TISS QL ZN STN: NORMAL

## 2025-03-29 LAB
BACTERIA SPEC CULT: NO GROWTH
GRAM STN SPEC: NORMAL

## 2025-03-30 LAB — RHODAMINE-AURAMINE STN SPEC: NORMAL

## 2025-04-01 LAB — BACTERIA TISS AEROBE CULT: NORMAL

## 2025-04-02 LAB — PSYCHE PATHOLOGY RESULT: NORMAL

## 2025-04-04 DIAGNOSIS — J43.1 PANLOBULAR EMPHYSEMA: ICD-10-CM

## 2025-04-04 DIAGNOSIS — I10 PRIMARY HYPERTENSION: ICD-10-CM

## 2025-04-04 RX ORDER — FLUTICASONE FUROATE, UMECLIDINIUM BROMIDE AND VILANTEROL TRIFENATATE 200; 62.5; 25 UG/1; UG/1; UG/1
1 POWDER RESPIRATORY (INHALATION) DAILY
Qty: 60 EACH | Refills: 1 | Status: SHIPPED | OUTPATIENT
Start: 2025-04-04

## 2025-04-04 RX ORDER — AMLODIPINE BESYLATE 10 MG/1
10 TABLET ORAL DAILY
Qty: 90 TABLET | Refills: 0 | Status: SHIPPED | OUTPATIENT
Start: 2025-04-04

## 2025-04-10 LAB
FUNGUS SPEC CULT: NORMAL
FUNGUS SPEC CULT: NORMAL

## 2025-04-11 ENCOUNTER — HOSPITAL ENCOUNTER (OUTPATIENT)
Dept: RADIOLOGY | Facility: HOSPITAL | Age: 70
Discharge: HOME | End: 2025-04-11
Attending: FAMILY MEDICINE
Payer: MEDICARE

## 2025-04-11 DIAGNOSIS — Z13.6 ENCOUNTER FOR ABDOMINAL AORTIC ANEURYSM (AAA) SCREENING: ICD-10-CM

## 2025-04-11 PROCEDURE — 76775 US EXAM ABDO BACK WALL LIM: CPT | Mod: TC

## 2025-04-14 ENCOUNTER — RESULTS FOLLOW-UP (OUTPATIENT)
Dept: FAMILY MEDICINE | Facility: CLINIC | Age: 70
End: 2025-04-14

## 2025-05-23 DIAGNOSIS — J43.1 PANLOBULAR EMPHYSEMA: ICD-10-CM

## 2025-05-23 RX ORDER — FLUTICASONE FUROATE, UMECLIDINIUM BROMIDE AND VILANTEROL TRIFENATATE 200; 62.5; 25 UG/1; UG/1; UG/1
POWDER RESPIRATORY (INHALATION)
Qty: 60 EACH | Refills: 1 | Status: SHIPPED | OUTPATIENT
Start: 2025-05-23

## 2025-06-09 ENCOUNTER — HOSPITAL ENCOUNTER (OUTPATIENT)
Dept: RADIOLOGY | Facility: HOSPITAL | Age: 70
Discharge: HOME | End: 2025-06-09
Attending: INTERNAL MEDICINE
Payer: MEDICARE

## 2025-06-09 DIAGNOSIS — R91.1 LUNG NODULE SEEN ON IMAGING STUDY: ICD-10-CM

## 2025-06-09 PROCEDURE — 71250 CT THORAX DX C-: CPT | Mod: TC

## (undated) DEVICE — Device

## (undated) DEVICE — NDL FLEXISION BIOPSY 19G

## (undated) DEVICE — FORCEP BIOPSY SUPER D

## (undated) DEVICE — NDL FLEXISION BIOPSY 21G

## (undated) DEVICE — CONNECTOR SWIVEL

## (undated) DEVICE — ADAPTER VISION PROBE & SUCTION

## (undated) DEVICE — BAG ION VISION PROBE